# Patient Record
Sex: FEMALE | Race: WHITE | NOT HISPANIC OR LATINO | Employment: FULL TIME | URBAN - METROPOLITAN AREA
[De-identification: names, ages, dates, MRNs, and addresses within clinical notes are randomized per-mention and may not be internally consistent; named-entity substitution may affect disease eponyms.]

---

## 2017-02-17 ENCOUNTER — COMMUNICATION - RIVER FALLS (OUTPATIENT)
Dept: FAMILY MEDICINE | Facility: CLINIC | Age: 32
End: 2017-02-17

## 2017-02-23 ENCOUNTER — TRANSFERRED RECORDS (OUTPATIENT)
Dept: HEALTH INFORMATION MANAGEMENT | Facility: CLINIC | Age: 32
End: 2017-02-23

## 2017-02-23 LAB — HPV ABSTRACT: NORMAL

## 2017-03-15 ENCOUNTER — OFFICE VISIT - RIVER FALLS (OUTPATIENT)
Dept: FAMILY MEDICINE | Facility: CLINIC | Age: 32
End: 2017-03-15

## 2017-03-15 ASSESSMENT — MIFFLIN-ST. JEOR: SCORE: 1530.81

## 2017-08-22 ENCOUNTER — OFFICE VISIT - RIVER FALLS (OUTPATIENT)
Dept: FAMILY MEDICINE | Facility: CLINIC | Age: 32
End: 2017-08-22

## 2017-08-22 ASSESSMENT — MIFFLIN-ST. JEOR: SCORE: 1534.43

## 2017-08-25 ENCOUNTER — OFFICE VISIT - RIVER FALLS (OUTPATIENT)
Dept: FAMILY MEDICINE | Facility: CLINIC | Age: 32
End: 2017-08-25

## 2017-08-25 ASSESSMENT — MIFFLIN-ST. JEOR: SCORE: 1533.53

## 2017-09-08 ENCOUNTER — OFFICE VISIT - RIVER FALLS (OUTPATIENT)
Dept: FAMILY MEDICINE | Facility: CLINIC | Age: 32
End: 2017-09-08

## 2017-09-08 ASSESSMENT — MIFFLIN-ST. JEOR: SCORE: 1541.69

## 2017-09-19 ENCOUNTER — OFFICE VISIT - RIVER FALLS (OUTPATIENT)
Dept: FAMILY MEDICINE | Facility: CLINIC | Age: 32
End: 2017-09-19

## 2017-09-19 ASSESSMENT — MIFFLIN-ST. JEOR: SCORE: 1547.14

## 2017-09-29 ENCOUNTER — AMBULATORY - RIVER FALLS (OUTPATIENT)
Dept: FAMILY MEDICINE | Facility: CLINIC | Age: 32
End: 2017-09-29

## 2017-11-20 ENCOUNTER — OFFICE VISIT - RIVER FALLS (OUTPATIENT)
Dept: FAMILY MEDICINE | Facility: CLINIC | Age: 32
End: 2017-11-20

## 2019-09-16 ENCOUNTER — OFFICE VISIT - RIVER FALLS (OUTPATIENT)
Dept: FAMILY MEDICINE | Facility: CLINIC | Age: 34
End: 2019-09-16

## 2019-09-16 ASSESSMENT — MIFFLIN-ST. JEOR: SCORE: 1584.11

## 2019-09-19 ENCOUNTER — COMMUNICATION - RIVER FALLS (OUTPATIENT)
Dept: FAMILY MEDICINE | Facility: CLINIC | Age: 34
End: 2019-09-19

## 2019-09-19 ENCOUNTER — TRANSFERRED RECORDS (OUTPATIENT)
Dept: HEALTH INFORMATION MANAGEMENT | Facility: CLINIC | Age: 34
End: 2019-09-19

## 2019-09-19 LAB — HPV ABSTRACT: NORMAL

## 2019-12-24 ENCOUNTER — OFFICE VISIT - RIVER FALLS (OUTPATIENT)
Dept: FAMILY MEDICINE | Facility: CLINIC | Age: 34
End: 2019-12-24

## 2019-12-24 ASSESSMENT — MIFFLIN-ST. JEOR: SCORE: 1585.01

## 2020-03-24 ENCOUNTER — OFFICE VISIT - RIVER FALLS (OUTPATIENT)
Dept: FAMILY MEDICINE | Facility: CLINIC | Age: 35
End: 2020-03-24

## 2020-11-20 ENCOUNTER — OFFICE VISIT - RIVER FALLS (OUTPATIENT)
Dept: FAMILY MEDICINE | Facility: CLINIC | Age: 35
End: 2020-11-20

## 2020-11-20 ASSESSMENT — MIFFLIN-ST. JEOR: SCORE: 1552.92

## 2020-12-18 ENCOUNTER — COMMUNICATION - RIVER FALLS (OUTPATIENT)
Dept: FAMILY MEDICINE | Facility: CLINIC | Age: 35
End: 2020-12-18

## 2022-02-12 VITALS
BODY MASS INDEX: 36.91 KG/M2 | TEMPERATURE: 98.1 F | BODY MASS INDEX: 36.7 KG/M2 | HEIGHT: 62 IN | DIASTOLIC BLOOD PRESSURE: 84 MMHG | HEART RATE: 72 BPM | HEIGHT: 62 IN | OXYGEN SATURATION: 94 % | WEIGHT: 199.4 LBS | DIASTOLIC BLOOD PRESSURE: 78 MMHG | DIASTOLIC BLOOD PRESSURE: 72 MMHG | SYSTOLIC BLOOD PRESSURE: 118 MMHG | BODY MASS INDEX: 34.98 KG/M2 | SYSTOLIC BLOOD PRESSURE: 126 MMHG | WEIGHT: 200.6 LBS | HEART RATE: 90 BPM | SYSTOLIC BLOOD PRESSURE: 124 MMHG | WEIGHT: 192.8 LBS | TEMPERATURE: 99.3 F | HEART RATE: 80 BPM

## 2022-02-12 VITALS
DIASTOLIC BLOOD PRESSURE: 78 MMHG | WEIGHT: 201 LBS | HEIGHT: 63 IN | TEMPERATURE: 98.4 F | BODY MASS INDEX: 35.61 KG/M2 | OXYGEN SATURATION: 99 % | SYSTOLIC BLOOD PRESSURE: 118 MMHG | HEART RATE: 90 BPM

## 2022-02-12 VITALS
BODY MASS INDEX: 36.36 KG/M2 | BODY MASS INDEX: 36.4 KG/M2 | HEIGHT: 62 IN | HEIGHT: 62 IN | WEIGHT: 197.6 LBS | SYSTOLIC BLOOD PRESSURE: 116 MMHG | DIASTOLIC BLOOD PRESSURE: 86 MMHG | HEART RATE: 72 BPM | SYSTOLIC BLOOD PRESSURE: 132 MMHG | HEART RATE: 72 BPM | TEMPERATURE: 97.2 F | WEIGHT: 197.8 LBS | DIASTOLIC BLOOD PRESSURE: 70 MMHG | TEMPERATURE: 97.1 F

## 2022-02-12 VITALS
WEIGHT: 207.2 LBS | HEIGHT: 63 IN | OXYGEN SATURATION: 97 % | DIASTOLIC BLOOD PRESSURE: 60 MMHG | HEART RATE: 88 BPM | SYSTOLIC BLOOD PRESSURE: 102 MMHG | BODY MASS INDEX: 36.71 KG/M2

## 2022-02-12 VITALS
WEIGHT: 197 LBS | SYSTOLIC BLOOD PRESSURE: 122 MMHG | HEART RATE: 68 BPM | DIASTOLIC BLOOD PRESSURE: 80 MMHG | TEMPERATURE: 97.8 F | BODY MASS INDEX: 36.25 KG/M2 | HEIGHT: 62 IN

## 2022-02-12 VITALS
HEIGHT: 63 IN | BODY MASS INDEX: 36.68 KG/M2 | OXYGEN SATURATION: 97 % | SYSTOLIC BLOOD PRESSURE: 104 MMHG | DIASTOLIC BLOOD PRESSURE: 64 MMHG | HEART RATE: 78 BPM | WEIGHT: 207 LBS

## 2022-02-16 NOTE — PROGRESS NOTES
Patient:   KM FRIEDMAN            MRN: [id number]            FIN: [id number]               Age:   32 years     Sex:  Female     :  1985   Associated Diagnoses:   Pneumonia   Author:   Bhavik Epps PA-C      Report Summary   Diagnosis  Pneumonia (XYN16-BM J18.9).  Patient InstructionsOrders   Visit Information      Date of Service: 2017 09:34 am  Performing Location: [facility]  Encounter#: [id number]      Primary Care Provider (PCP):  Bhavik Epps PA-C    NPI# [id number]   Visit type:  New symptom.    Source of history:  Self, Medical record.    History limitation:  None.       Chief Complaint   2017 9:40 AM CST   Fever since last wednesday; using tylenol and IBU; barky non productive cough, HA, bodu aches, muscle stiffness, chills and fatigue        History of Present Illness             The patient presents with cough.  The cough is described as hacking.  The severity of the cough is moderate.  The cough is episodic, fluctuates in intensity and is worsening.  The cough has lasted for 5 day(s).  Associated symptoms consist of fever, Myalgias. Had influenza vaccine 4 weeks ago. , denies nasal congestion, denies rhinorrhea and denies sore throat.  CC above noted and confirmed with the patient..        Review of Systems   Eye:  Negative.    Ear/Nose/Mouth/Throat:  Negative except as documented in history of present illness.    Respiratory:  Negative except as documented in history of present illness.             Health Status   Allergies:    Allergic Reactions (All)  No Known Medication Allergies  Canceled/Inactive Reactions (All)  No known allergies   Medications:  (Selected)   Prescriptions  Prescribed  Zithromax 250 mg oral tablet: 1 packet(s), PO, Once, Instructions: as directed on package labeling. Two tablets po on day one, then one tablet daily x four days, # 6 tab(s), 0 Refill(s), Type: Soft Stop, Pharmacy: [id number], 1 packet(s) po once,Instr:as directed on  pac...   Problem list:    All Problems  Moderate Major Depression / ICD-9-.22 / Confirmed  Obese / ICD-9-.00 / Probable  Resolved: Pregnancy / SNOMED CT 612388807  Resolved: Tobacco user / ICD-9-.1      Histories   Past Medical History:    Active  Obese (278.00)  Resolved  Pregnancy (509324956):  Resolved on 5/11/2005 at 20 years.  Tobacco user (305.1):  Resolved.  Comments:  11/29/2012 CST 11:28 AM CST - Thu Todd LPN  Quit 10/12   Family History:    Hypertension  Grandmother (M) (Denise Castañeda))  Hypercholesterolemia  Father     Procedure history:    Childbirth (5784052847) in 2005 at 20 Years.  Ankle fracture - Right (155232721) in the month of 6/1997 at 12 Years.   Social History:        Alcohol Assessment: Current            Current                     Comments:                      10/11/2012 - Thu Todd LPN                     Occasional-social.4-5 drinks.      Tobacco Assessment: Past            Cigarettes, 3 per day.            Past                     Comments:                      11/29/2012 - Thu Todd LPN                     Quit 10/12.      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      Home and Environment Assessment            Marital status: Single.      Nutrition and Health Assessment            Type of diet: Regular.      Exercise and Physical Activity Assessment            Exercise type: Walking.                     Comments:                      10/11/2012 Thu Brunner LPN                     Occasional      Sexual Assessment            Sexually active: Yes.  Sexual orientation: Heterosexual.      Other Assessment            First menses age 12.  Regular menses.  Menstrual duration 10-14 days.  Cycle interval 28 days.  No history of                abnormal Pap smear.        Physical Examination   Vital Signs   11/20/2017 9:40 AM CST Temperature Tympanic 99.3 DegF    Peripheral Pulse Rate 90 bpm    HR Method Electronic    Systolic Blood Pressure 126 mmHg    Diastolic Blood Pressure 84 mmHg    Mean Arterial Pressure 98 mmHg    BP Site Right arm    BP Method Manual    Oxygen Saturation 94 %      Measurements from flowsheet : Measurements   11/20/2017 9:40 AM CST   Weight Measured - Standard                192.8 lb     General:  Alert and oriented, No acute distress.    Eye:  Pupils are equal, round and reactive to light, Extraocular movements are intact, Normal conjunctiva.    HENT:  Normocephalic, Oral mucosa is moist, No pharyngeal erythema.    Neck:  Supple, Non-tender, No lymphadenopathy.    Respiratory:  Breath sounds are equal.         Respirations: crackles at right base.    Cardiovascular:  Normal rate, Regular rhythm, No murmur.    Psychiatric:  Cooperative, Appropriate mood & affect.       Impression and Plan   Diagnosis     Pneumonia (PIA94-TQ J18.9).     Patient Instructions:       Counseled: Patient, Regarding diagnosis, Regarding treatment, Regarding medications, Regarding activity, Verbalized understanding.    Orders     Orders (Selected)   Prescriptions  Prescribed  Zithromax 250 mg oral tablet: 1 packet(s), PO, Once, Instructions: as directed on package labeling. Two tablets po on day one, then one tablet daily x four days, # 6 tab(s), 0 Refill(s), Type: Soft Stop, Pharmacy: [id number], 1 packet(s) po once,Instr:as directed on pac....     Take medicine as prescribed, side effects discussed.  Tylenol/ibuprofen for fever and discomfort.  Push fluids.  RTC if not improving in 36-48 hours, prior if concerns as we have discussed.

## 2022-02-16 NOTE — PROGRESS NOTES
Patient:   KM FRIEDMAN            MRN: [id number]            FIN: [id number]               Age:   32 years     Sex:  Female     :  1985   Associated Diagnoses:   Neck strain   Author:   Rajinder Tee MD      Chief Complaint   2017 8:15 AM CDT    Work comp f/u neck pain   Chief complaint and symptoms noted above confirmed with patient.      History of Present Illness             The patient presents with neck pain.  The location of the neck pain is the right.  The neck pain is described as aching and burning.  The severity of the neck pain is moderate.  The neck pain is constant.  The neck pain has lasted for 3 week(s).  Radiation of pain: to the right upper extremity.  IBU not helping.  not tolerating prednisone..  PT helping significantly.    Flexion 37  to 45  Extension 25 to 42  Left Rotation 61 to 70  Right Rotation  35 to 62  Left Sidebending   30  to 35  Right Sidebending  11-32.  Exacerbating factors consist of changing position, turning head and driving.  Relieving factors consist of analgesics.        Review of Systems   Constitutional:  Negative.    Musculoskeletal:  Negative except as documented in history of present illness.       Health Status   Allergies:    Allergies reviewed.     Medications:    Medications reviewed.     Problem list:    Problem list reviewed.        Histories   Past Medical History:    Past medical history reviewed.     Family History:    Family history reviewed.     Procedure history:    Procedure history reviewed.     Social History: Reviewed         Physical Examination   Vital Signs   2017 8:15 AM CDT Temperature Tympanic 98.1 DegF    Peripheral Pulse Rate 72 bpm    Pulse Site Radial artery    HR Method Manual    Systolic Blood Pressure 118 mmHg    Diastolic Blood Pressure 72 mmHg    Mean Arterial Pressure 87 mmHg    BP Site Left arm    BP Method Manual      General:  Alert and oriented, No acute distress.    Musculoskeletal:       Spine/torso exam:  Cervical ( Right, Moderate, Tenderness, Pain, No numbness, No tingling, Strength  5  out of 5, Range of motion ( Restricted by pain ) ).    Neurologic:  Alert, Oriented, Normal sensory, Normal motor function.    Psychiatric:  Cooperative, Appropriate mood & affect.       Review / Management   Results review:  No X-ray at this time, no history of trauma.       Impression and Plan   Diagnosis     Neck strain (CAH75-KC S16.1XXA).     Orders     Will go back to work no restrictions, finish with PT..        Professional Services   Counseling Summary:  This was a 15 minute visit with greater than 50% of that time spent counseling the patient.

## 2022-02-16 NOTE — PROGRESS NOTES
Patient:   KM BRUCE            MRN: [id number]            FIN: [id number]               Age:   35 years     Sex:  Female     :  1985   Associated Diagnoses:   Well adult exam; Depression   Author:   Rajinder Tee MD      Report Summary   DiagnosisCourse:  Well controlled. Counseled:  Patient.    Visit Information      Date of Service: 2020 02:02 pm  Performing Location: [facility]  Encounter#: [id number]      Primary Care Provider (PCP):  Bhavik Epps PA-C    NPI# [id number]      Referring Provider:  Rajinder Tee MD    NPI# [id number]   Visit type:  Annual exam.    Source of history:  Self.    History limitation:  None.       Chief Complaint   2020 2:21 PM [facility]   Annual physical exam. Discuss depression.     Patient presents today for a general physical.      Well Adult History   Well Adult History   The general health status is good.  The patient's diet is described as balanced.  The effect on daily activities is no change in activity level, no change in eating habits and no change in sexual activity.        Review of Systems   Constitutional:  No fever, No chills, No sweats.    Eye:  No blurring, No double vision.    Respiratory:  No shortness of breath, No cough, No wheezing.    Cardiovascular:  No chest pain, No palpitations, No peripheral edema.    Breast:  Negative.    Gastrointestinal:  No nausea, No vomiting, No diarrhea, No constipation.    Genitourinary:  No dysuria.    Gynecologic:  Negative.    Hematology/Lymphatics:  No bruising tendency.    Endocrine:  No excessive thirst, No polyuria, No cold intolerance, No heat intolerance.    Musculoskeletal:  No joint pain.    Integumentary:  No rash.    Neurologic:  Not alert and oriented X4, No confusion, No numbness, No tingling, No headache.    Psychiatric:  Depression, PHQ-9 22, Lost Father and SO.  Grief reaction..    All other systems reviewed and negative      Health Status   Allergies:    Allergic  Reactions (Selected)  No Known Medication Allergies   Medications:  (Selected)   Prescriptions  Prescribed  FLUoxetine 40 mg oral capsule: = 1 cap(s) ( 40 mg ), Oral, daily, # 90 cap(s), 3 Refill(s), Type: Maintenance, Pharmacy: [id number], 1 cap(s) Oral daily   Problem list:    All Problems  Macromastia / SNOMED CT 3655899752 / Confirmed  Obese / ICD-9-.00 / Probable  Moderate Major Depression / ICD-9-.22 / Confirmed  Resolved: Tobacco user / ICD-9-.1  Resolved: Pregnancy / SNOMED CT 475946659      Histories   Past Medical History:    Active  Obese (ICD-9-.00)  Moderate Major Depression (ICD-9-.22)  Macromastia (SNOMED CT 5907220546)  Resolved  Pregnancy (SNOMED CT 729034710): Onset on 8/4/2004 at 19 years.  Resolved on 5/11/2005 at 20 years.  Tobacco user (ICD-9-.1):  Resolved.  Comments:  11/29/2012 CST 11:28 AM CST - Thu Todd LPN  Quit 10/12   Family History:    Hypertension  Grandmother (M) (Denise Castañeda))  Hypercholesterolemia  Father     Procedure history:    Ankle fracture - Right (SNOMED CT 796156866) in the month of 6/1997 at 12 Years.   Social History:        Electronic Cigarette/Vaping Assessment            Electronic Cigarette Use: Never.      Alcohol Assessment: Current            Current                     Comments:                      10/11/2012 - Thu Todd LPN                     Occasional-social.4-5 drinks.      Tobacco Assessment: Current            5-9 cigarettes (between 1/4 to 1/2 pack)/day in last 30 days, Cigarettes, 5 per day.  Total pack years: 18.               Ready to change: Yes.            Never (less than 100 in lifetime)            Past                     Comments:                      11/29/2012 - Thu Todd LPN                     Quit 10/12.      Substance Abuse Assessment: Denies Substance Abuse            Never      Employment and Education Assessment            Work/School description: LPN.  Highest  education level: Associate degree.                     Comments:                      10/11/2012 Thu Brunner LPN                     Works part time.      Home and Environment Assessment            Marital status: .  Spouse/Partner name: Topher.  Living situation: Home/Independent.               Injuries/Abuse/Neglect in household: No.  Agency(s)/Others notified: No.  Family/Friends available for               support: Yes.  Risks in environment: Pool/Gaming, Stairs.      Nutrition and Health Assessment            Type of diet: Regular.      Exercise and Physical Activity Assessment            Exercise type: Walking.                     Comments:                      10/11/2012 Thu Brunner LPN                     Occasional      Sexual Assessment            Identifies as female, History of STD: No.  Contraceptive Use Details: None.  History of sexual abuse: No.            Sexually active: Yes.  Sexual orientation: Heterosexual.      Other Assessment            First menses age 12.  Regular menses.  Menstrual duration 10-14 days.  Cycle interval 28 days.  No history of               abnormal Pap smear.  ,        Electronic Cigarette/Vaping Assessment            Electronic Cigarette Use: Never.      Alcohol Assessment: Current            Current                     Comments:                      10/11/2012 Thu Brunner LPN                     Occasional-social.4-5 drinks.      Tobacco Assessment: Current            5-9 cigarettes (between 1/4 to 1/2 pack)/day in last 30 days, Cigarettes, 5 per day.  Total pack years: 18.               Ready to change: Yes.            Never (less than 100 in lifetime)            Past                     Comments:                      11/29/2012 Thu Brunner LPN                     Quit 10/12.      Substance Abuse Assessment: Denies Substance Abuse            Never      Employment and Education Assessment            Work/School description: LPN.  [employer]  education level: Associate degree.                     Comments:                      10/11/2012 - [employer]                     Works part time.      Home and Environment Assessment            Marital status: .  Spouse/Partner name: Topher.  Living situation: Home/Independent.               Injuries/Abuse/Neglect in household: No.  Agency(s)/Others notified: No.  Family/Friends available for               support: Yes.  Risks in environment: Pool/Gaming, Stairs.      Nutrition and Health Assessment            Type of diet: Regular.      Exercise and Physical Activity Assessment            Exercise type: Walking.                     Comments:                      10/11/2012 - Thu Todd LPN                     Occasional      Sexual Assessment            Identifies as female, History of STD: No.  Contraceptive Use Details: None.  History of sexual abuse: No.            Sexually active: Yes.  Sexual orientation: Heterosexual.      Other Assessment            First menses age 12.  Regular menses.  Menstrual duration 10-14 days.  Cycle interval 28 days.  No history of               abnormal Pap smear.        Physical Examination   Vital Signs   11/20/2020 2:21 PM CST Temperature Tympanic 98.4 DegF    Peripheral Pulse Rate 90 bpm    Systolic Blood Pressure 118 mmHg    Diastolic Blood Pressure 78 mmHg    Mean Arterial Pressure 91 mmHg    BP Site Right arm    BP Method Manual    Oxygen Saturation 99 %      Measurements from flowsheet : Measurements   11/20/2020 2:21 PM CST Height Measured - Standard 62.5 in    Weight Measured - Standard 201 lb    BSA 2 m2    Body Mass Index 36.17 kg/m2  HI      General:  Alert and oriented, No acute distress.    Eye:  Pupils are equal, round and reactive to light, Extraocular movements are intact, Normal conjunctiva.    HENT:  Normocephalic, Tympanic membranes are clear, Normal hearing, Oral mucosa is moist, No pharyngeal erythema.    Neck:  Supple, No carotid bruit, No  lymphadenopathy, No thyromegaly.    Respiratory:  Lungs are clear to auscultation, Breath sounds are equal.    Cardiovascular:  Regular rhythm, No murmur, Good pulses equal in all extremities, No edema.    Breast:  Declined Exam.    Gastrointestinal:  Soft, Non-tender, Normal bowel sounds, No organomegaly.    Genitourinary:  Declined Exam.    Musculoskeletal:  Normal range of motion.    Integumentary:  Warm, Dry, Pink.    Neurologic:  Alert, Oriented, Normal sensory, Normal motor function, No focal deficits.    Psychiatric:  Cooperative.         Mood and affect: Depressed, Labile.         Behavior: Relaxed.         Judgment: Able to make sensible decisions.         Thought process: Appropriate.       Impression and Plan   Diagnosis     Well adult exam (AOF30-BO Z00.00).     Course:  Well controlled.    Diagnosis     Depression (UKY28-SN F32.9).     Course:  Worsening.    Orders     Orders   Pharmacy:  FLUoxetine 20 mg oral capsule (Prescribe): = 1 cap(s) ( 20 mg ), Oral, daily, # 30 cap(s), 0 Refill(s), Type: Maintenance, Pharmacy: [facility], 1 cap(s) Oral daily, 62.5, in, 11/20/2020 2:21 PM CST, Height Measured, 201, lb, 11/20/2020 2:21 PM CST, Weight Measured.     Orders   Requests (Consults / Referrals):  Referral (Request) (Order): 11/20/2020 2:37 PM CST, Referred to: Psychology, Depression.     Orders   Requests (Return to Office):  Return to Clinic (Request) (Order): RFV: Video visit for recheck on depression, Return in 1 month.     Counseled:  Patient.

## 2022-02-16 NOTE — PROGRESS NOTES
Patient:   KM FRIEDMAN            MRN: [id number]            FIN: [id number]               Age:   32 years     Sex:  Female     :  1985   Associated Diagnoses:   Neck strain   Author:   Rajinder Tee MD      Chief Complaint   2017 11:19 AM CDT    Work comp neck pain - improved     Chief complaint and symptoms noted above confirmed with patient.      History of Present Illness             The patient presents with neck pain.  The location of the neck pain is the right.  The neck pain is described as aching and burning.  The severity of the neck pain is moderate.  The neck pain is constant.  The neck pain has lasted for 3 week(s).  Radiation of pain: to the right upper extremity.  IBU not helping.  not tolerating prednisone..  PT helping significantly.    Flexion 37  to 47  Extension 25 to 42  Left Rotation 61 to 66  Right Rotation  35 to 62  Left Sidebending   30  to 35  Right Sidebending  11-30.  Exacerbating factors consist of changing position, turning head and driving.  Relieving factors consist of analgesics.        Review of Systems   Constitutional:  Negative.    Musculoskeletal:  Negative except as documented in history of present illness.       Health Status   Allergies:    Allergic Reactions (Selected)  No Known Medication Allergies   Medications:  (Selected)   Prescriptions  Prescribed  tiZANidine 2 mg oral capsule: 1 cap(s) ( 2 mg ), po, q8 hrs, PRN: as needed for muscle spasm, # 30 cap(s), 0 Refill(s), Type: Maintenance, Pharmacy: [facility] #[id number], 1 cap(s) po q8 hrs,PRN:as needed for muscle spasm   Problem list: Reviewed      Histories   Past Medical History:    Past medical history reviewed.   Family History:    Family history reviewed.   Procedure history:    Procedure history reviewed.   Social History: Reviewed      Physical Examination   Vital Signs   2017 11:19 AM CDT Peripheral Pulse Rate 80 bpm    Pulse Site Radial artery    HR Method Manual    Systolic Blood  Pressure 124 mmHg    Diastolic Blood Pressure 78 mmHg    Mean Arterial Pressure 93 mmHg    BP Site Right arm    BP Method Manual      General:  Alert and oriented, No acute distress.    Musculoskeletal:       Spine/torso exam: Cervical ( Right, Moderate, Tenderness, Pain, No numbness, No tingling, Strength  5  out of 5, Range of motion ( Restricted by pain ) ).    Neurologic:  Alert, Oriented, Normal sensory, Normal motor function.    Psychiatric:  Cooperative, Appropriate mood & affect.       Review / Management   Results review:  No X-ray at this time, no history of trauma.       Impression and Plan   Diagnosis     Neck strain (ETF90-VU S16.1XXA).     Orders     Will try going back to work no lifting more than 10 pounds.  Continue with PT..        Professional Services   Counseling Summary:  This was a 15 minute visit with greater than 50% of that time spent counseling the patient.

## 2022-02-16 NOTE — LETTER
(Inserted Image. Unable to display)   [address] [place] [address]  September 19, 2019      KM BRUCE       [address], [place] [id number]      Dear KM,    Thank you for selecting [facility] for your healthcare needs. Below you will find the results of the recent tests done at our clinic.    Normal pap smear with negative HPV.  The pap can be repeated every five years unless there is concern about increased risk. We still will want to see you once a year for an annual exam.    Result Name Current Result   ThinPrep PAP with HPV   9/16/2019       Please contact me or my assistant at [phone] if you have any questions or concerns.     Sincerely,        Landon Mcdonald M.D.

## 2022-02-16 NOTE — NURSING NOTE
Depression Screening Entered On:  9/16/2019 1:58 PM CDT    Performed On:  9/16/2019 1:57 PM CDT by Rhiannon Mccall CMA               Depression Screening   Little Interest - Pleasure in Activities :   Several days   Feeling Down, Depressed, Hopeless :   Nearly every day   Initial Depression Screen Score :   4    Trouble Falling or Staying Asleep :   Several days   Feeling Tired or Little Energy :   Nearly every day   Poor Appetite or Overeating :   Nearly every day   Feeling Bad About Yourself :   Several days   Trouble Concentrating :   Several days   Moving or Speaking Slowly :   Not at all   Thoughts Better Off Dead or Hurting Self :   Not at all   Detailed Depression Screen Score :   9    Total Depression Screen Score :   13    EITAN Difficulty with Work, Home, Others :   Very difficult   Rhiannon Mccall CMA - 9/16/2019 1:57 PM CDT

## 2022-02-16 NOTE — NURSING NOTE
Comprehensive Intake Entered On:  12/24/2019 10:27 AM CST    Performed On:  12/24/2019 10:24 AM CST by Rhiannon Mccall CMA               Summary   Chief Complaint :   follow-up Depression and smoking cessiation; smoke free for 2 months    Last Menstrual Period :   12/3/2019 CST   Menstrual Status :   Premenarcheal   Weight Measured :   207.2 lb(Converted to: 207 lb 3 oz, 93.98 kg)    Height Measured :   62.75 in(Converted to: 5 ft 3 in, 159.38 cm)    Body Mass Index :   36.99 kg/m2 (HI)    Body Surface Area :   2.04 m2   Systolic Blood Pressure :   102 mmHg   Diastolic Blood Pressure :   60 mmHg   Mean Arterial Pressure :   74 mmHg   Peripheral Pulse Rate :   88 bpm   Oxygen Saturation :   97 %   Rhiannon Mccall CMA - 12/24/2019 10:24 AM CST   Health Status   Allergies Verified? :   Yes   Medication History Verified? :   Yes   Immunizations Current :   Yes   Medical History Verified? :   Yes   Pre-Visit Planning Status :   Completed   Tobacco Use? :   Former smoker   Rhiannon Mccall CMA - 12/24/2019 10:24 AM CST   Consents   Consent for Immunization Exchange :   Consent Granted   Consent for Immunizations to Providers :   Consent Granted   Rhiannon Mccall CMA - 12/24/2019 10:24 AM CST   Meds / Allergies   (As Of: 12/24/2019 10:27:38 AM [facility])   Allergies (Active)   No Known Medication Allergies  Estimated Onset Date:   Unspecified ; Created By:   Carolann Sidhu CMA; Reaction Status:   Active ; Category:   Drug ; Substance:   No Known Medication Allergies ; Type:   Allergy ; Updated By:   Carolann Sidhu CMA; Reviewed Date:   12/24/2019 10:26 AM [facility]        Medication List   (As Of: 12/24/2019 10:27:38 AM [facility])   Prescription/Discharge Order    buPROPion  :   buPROPion ; Status:   Prescribed ; Ordered As Mnemonic:   Wellbutrin  mg/24 hours oral tablet, extended release ; Simple Display Line:   150 mg, 1 tab(s), Oral, q 24 hrs, decreased dose, 30 tab(s), 0 Refill(s) ; Ordering Provider:   Landon Mcdonald MD; Catalog Code:    buPROPion ; Order Dt/Tm:   12/13/2019 10:08:16 AM CST          FLUoxetine  :   FLUoxetine ; Status:   Prescribed ; Ordered As Mnemonic:   FLUoxetine 20 mg oral capsule ; Simple Display Line:   20 mg, 1 cap(s), Oral, daily, 30 cap(s), 0 Refill(s) ; Ordering Provider:   Landon Mcdonald MD; Catalog Code:   FLUoxetine ; Order Dt/Tm:   12/13/2019 10:08:15 AM CST

## 2022-02-16 NOTE — PROGRESS NOTES
Patient:   KM FRIEDMAN            MRN: [id number]            FIN: [id number]               Age:   32 years     Sex:  Female     :  1985   Associated Diagnoses:   Pap smear of cervix shows high risk HPV present; Generalized anxiety disorder; Dysthymia   Author:   Landon Mcdonald MD      CC: abnormal pap follow up    HPI: Patient with normal pap with + high risk HPV. Here for colposcopy. No prior abnormal pap smears.  Also notes improvement in anxiety and mood but not 100% effective. would like to try increasing dose of wellbutrin.      Physical Examination   Genitourinary:  No costovertebral angle tenderness, No inguinal tenderness, No urethral discharge, No lesions.    Patient is alert and cooperative. Normal affect      Procedure   Colposcopy procedure   Confirmed: patient.     Informed consent: signed by patient.     Preparation and technique: placed in lithotomy position, vaginal speculum inserted, colposcope placed, cervix inspected, cervix swabbed (with saline solution, with acetic acid solution), cervix re-inspected.     Findings: original squamous epithelium (smooth and pink, no change after acetic acid applied), no abnormal colposcopic lesion noted, no cervical inflammation noted, no cervical lesion noted.     Procedure tolerated: well.     Complications: none.        Impression and Plan   Diagnosis     Pap smear of cervix shows high risk HPV present (PBV66-SR R87.810).     Orders     Orders (Selected)   Outpatient Orders  Order  Return to Clinic (Request): RFV: repeat pap smear due in one year, Return in 1 year.     Diagnosis     Generalized anxiety disorder (HGN54-QQ F41.1).     Dysthymia (MPH44-MW F34.1).     Course:  Improving.    Plan:  Will increase wellbutrin. Follow up as needed..    Orders     Orders (Selected)   Prescriptions  Prescribed  Wellbutrin  mg/24 hours oral tablet, extended release: 1 tab(s) ( 300 mg ), po, q 24 hrs, # 30 tab(s), 2 Refill(s), Type: Maintenance, Pharmacy:  [id number], 1 tab(s) po q 24 hrs.

## 2022-02-16 NOTE — PROGRESS NOTES
Patient:   KM BRUCE            MRN: [id number]            FIN: [id number]               Age:   34 years     Sex:  Female     :  1985   Associated Diagnoses:   Well adult exam; Macromastia; Moderate Major Depression; Tobacco use   Author:   Landon Mcdonald MD      Visit Information   Visit type:  Annual exam.    Source of history:  Self.    History limitation:  None.       Chief Complaint   2019 1:04 PM CDT    Phyiscal        Well Adult History   Well Adult History             The patient presents for well adult exam.  The patient's general health status is described as patient has noticed increased weight gain, fatigue, and depression symptoms. Otherwise, has felt healthy.  The patient's diet is described as reviewed with patient, not following any specific diet currently although tries to eat healthy.  Exercise: occasional, works nights, notes increased back pain also interferes with exercise.  Last menstrual period: regular.  Medical encounters: none.  Additional pertinent history: due for pap smear today.     patient has other concerns today as well  depression has been worse recently, PHQ9 reviewed and is elevated, no suicidal thoughts, would like to go back on medication, has been smoking about 5-6 cigarettes a day so would like to try bupropion again  also discussed back pain and large breasts, ongoing upper back pain, wears 40F bra when she wears a regular bra but usually wears sports bras to try to provide more support and compression, interferes with exercise, has been thinking about consultation for reduction but not ready to schedule at this time  discussed options for managing diet, encouraged to consider tracking and being more mindful about food choices, 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   Family History:    Hypertension  Grandmother (M) (Denise (Julita))  Hypercholesterolemia  Father     Procedure history:    Childbirth (2450887873) in 2005 at 20 Years.  Ankle fracture - Right (559226000) in the month of 6/1997 at 12 Years.   Social History:        Alcohol Assessment: Current            Current                     Comments:                      10/11/2012 - Thu Todd LPN                     Occasional-social.4-5 drinks.      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      Exercise and Physical Activity Assessment            Exercise type: Walking.                     Occasional      Sexual Assessment            Sexually active: Yes.  Sexual orientation: Heterosexual.      Other Assessment            First menses age 12.  Regular menses.  Menstrual duration 10-14 days.  Cycle interval 28 days.  No history of               abnormal Pap smear.        Physical Examination   Last Menstrual Period: 9/9/2019     Vital Signs   9/16/2019 1:04 PM CDT Peripheral Pulse Rate 78 bpm    Systolic Blood Pressure 104 mmHg    Diastolic Blood Pressure 64 mmHg    Mean Arterial Pressure 77 mmHg    Oxygen Saturation 97 %      Measurements from flowsheet : Measurements   9/16/2019 1:04 PM CDT Height Measured - Standard  62.75 in    Weight Measured - Standard 207.0 lb    BSA 2.04 m2    Body Mass Index 36.96 kg/m2  HI      General:  Alert and oriented, No acute distress.    Eye:  Pupils are equal, round and reactive to light, Extraocular movements are intact, Normal conjunctiva.    HENT:  Normocephalic, Tympanic membranes are clear, Oral mucosa is moist, No pharyngeal erythema.    Neck:  Supple, Non-tender, No lymphadenopathy, No thyromegaly.    Respiratory:  Lungs are clear to auscultation.    Cardiovascular:  Normal rate, Regular rhythm.    Breast:  No mass, No tenderness, No discharge.    Gastrointestinal:  Soft, Non-tender, Non-distended, No organomegaly.    Genitourinary:  Normal genitalia for age and sex, No urethral discharge, No lesions.         Perineum: Within normal limits.         Vagina: Within normal limits.         Uterus: Within normal limits.         Ovaries: Within normal limits.         Adnexa: Within normal limits.    Musculoskeletal:  Normal range of motion, Normal strength, tender in upper back, grooves noted in shoulders from brastraps.    Integumentary:  Warm, Dry, Pink, No rash, no concerning lesions.    Neurologic:  Alert, Oriented, Normal sensory.    Psychiatric:  Cooperative, Appropriate mood & affect.       Impression and Plan   Diagnosis     Well adult exam (DSO95-WY Z00.00).     Course:  Progressing as expected.    Patient Instructions:       Counseled: Patient, BMI, diet, and exercise.    Diagnosis     Macromastia (QVB07-EO N62).     Course:  patient will let me know if she is willing to proceed with consult regarding breast reduction, which I think could help significantly with pain and activity level.    Diagnosis     Moderate Major Depression (JZG41-ZL F32.1).     Tobacco use (TMK95-FX Z72.0).     Course:  Worsening.    Plan:  call in 4 weeks with update to consider whether increased dose is needed.    Orders     Orders (Selected)   Prescriptions  Prescribed  Wellbutrin  mg/24 hours oral  tablet, extended release: = 1 tab(s) ( 150 mg ), po, q 24 hrs, # 30 tab(s), 5 Refill(s), Type: Maintenance, Pharmacy: [id number], 1 tab(s) Oral q 24 hrs.

## 2022-02-16 NOTE — PROGRESS NOTES
Patient:   KM FRIEDMAN            MRN: [id number]            FIN: [id number]               Age:   32 years     Sex:  Female     :  1985   Associated Diagnoses:   None   Author:   Rajinder Tee MD       -   Today's date:  2017 11:35:00 AM .        -   To whom it may concern:        This patient is currently under my care and Was seen in my office on  2017.  .  He/ she may return to work, with the following restrictions: no lifting greater than  10  pounds.  The patient will need follow-up care in  2  weeks.  Please contact me if you have any questions or concerns.      -   Sincerely,             Rajinder Tee MD  [address]  [address] [phone]

## 2022-02-16 NOTE — NURSING NOTE
CAGE Assessment Entered On:  12/11/2020 9:39 AM CST    Performed On:  11/20/2020 9:39 AM CST by Ana Maria Padilla               Assessment   Have you ever felt you should cut down on your drinking :   No   Have people annoyed you by criticizing your drinking :   No   Have you ever felt bad or guilty about your drinking :   No   Have you ever taken a drink first thing in the morning to steady your nerves or get rid of a hangover (Eye-opener) :   No   CAGE Score :   0    Ana Maria Padilla - 12/11/2020 9:39 AM CST

## 2022-02-16 NOTE — TELEPHONE ENCOUNTER
---------------------  From: Landon Mcdonald MD   Sent: 3/25/2020 3:15:08 PM CDT  Subject: lab results     COVID-19 testing negative for disease. Patient called and is reassured. No fevers, noting fatigue and achiness. No shortness of breath. Discussed that testing isn't perfect but this is good news. Should still wait to return to work until symptoms have resolved.

## 2022-02-16 NOTE — PROGRESS NOTES
Patient:   KM FRIEDMAN            MRN: [id number]            FIN: [id number]               Age:   32 years     Sex:  Female     :  1985   Associated Diagnoses:   Neck strain   Author:   Rajinder Tee MD      Chief Complaint   2017 8:47 AM CDT    Work comp neck pain - some improvement   Chief complaint and symptoms noted above confirmed with patient.      History of Present Illness             The patient presents with neck pain.  The location of the neck pain is the right.  The neck pain is described as aching and burning.  The severity of the neck pain is moderate.  The neck pain is constant.  The neck pain has lasted for 9 day(s).  Radiation of pain: to the right upper extremity.  IBU not helping.  not tolerating prednisone..  Exacerbating factors consist of changing position, turning head and driving.  Relieving factors consist of analgesics.        Review of Systems   Constitutional:  Negative.    Musculoskeletal:  Negative except as documented in history of present illness.       Health Status   Allergies:    Allergies reviewed.        Medications: reviewed      Problem list: Reviewed         Histories   Past Medical History:    Past medical history reviewed.        Family History: Noncontributory      Procedure history:    Procedure history reviewed.        Social History: Reviewed         Physical Examination   Vital Signs   2017 8:47 AM CDT Temperature Tympanic 97.2 DegF  LOW    Peripheral Pulse Rate 72 bpm    Pulse Site Radial artery    HR Method Manual    Systolic Blood Pressure 132 mmHg    Diastolic Blood Pressure 86 mmHg    Mean Arterial Pressure 101 mmHg    BP Site Left arm    BP Method Manual      General:  Alert and oriented, No acute distress.    Musculoskeletal:       Spine/torso exam: Cervical ( Right, Moderate, Tenderness, Pain, No numbness, No tingling, Strength  5  out of 5, Range of motion ( Restricted by pain ) ).    Neurologic:  Alert, Oriented, Normal sensory,  Normal motor function.    Psychiatric:  Cooperative, Appropriate mood & affect.       Review / Management   Results review:  No X-ray at this time, no history of trauma.       Impression and Plan   Diagnosis     Neck strain (LZV55-FQ S16.1XXA).     Orders     Orders   Pharmacy:  predniSONE 10 mg oral tablet (Complete).     Orders   Requests (Consults / Referrals):  Referral (Request) (Order): 8/25/2017 9:01 AM CDT, Referred to: Physical Therapy, Neck strain.     Will keep off work for another week..        Professional Services   Counseling Summary:  This was a 15 minute visit with greater than 50% of that time spent counseling the patient.

## 2022-02-16 NOTE — TELEPHONE ENCOUNTER
---------------------  From: Kary Coleman LPN (Phone Messages Pool ([id number]_[facility]))   To: [facility] Message Pool ([id number]_[facility]);     Sent: 2/3/2021 12:20:26 PM CST  Subject: medication follow up     Phone Message    PCP:   UZAIR asked for T      Time of Call:  11:02am       Person Calling:  pt  Phone number:  [phone]    Returned call at: 12:15pm    Note:   Pt LM stating she is following up on medications. Pt says she feels like the Prozac is working well. She says she is having a hard time getting in a good grove with using the Zyprexa. Pt says when she gets home from work in the morning she sometimes has to be up by 2pm and if she does not get 6-8 hours of sleep she feels groggy and out of it.    Pt says when she is home and not having to work night shift the Zyprexa works but she sometimes is not able to take it. Pt says she does not want to mix the 2 medications permanently. Pt wanting to discuss stating on Prozac and maybe discontinuing Zyprexa and using melatonin or an OTC sleeping medication for when she needs to get sleep.    LM for pt letting her know message was received and will be forwarded to [facility] when he is back in clinic tomorrow.    Last office visit and reason:  11/20/20 CHT well adult exam- female---------------------  From: Rylie Kelly CMA ([facility] Message Pool ([id number]_[facility]))   To: Rajinder Tee MD;     Sent: 2/4/2021 7:58:08 AM CST  Subject: FW: medication follow up  ** Submitted: **  Order:FLUoxetine (FLUoxetine 20 mg oral capsule)  1 cap(s)  Oral  daily  Qty:  90 cap(s)        Refills:  1          Substitutions Allowed     Route To Pharmacy - [facility]    Signed by Rajinder Tee MD  2/4/2021 4:56:00 PM [facility]    ** Submitted: **  Complete:OLANZapine (OLANZapine 2.5 mg oral tablet)   Signed by Rajinder Tee MD  2/4/2021 4:56:00 PM [facility]---------------------  From: Rajinder Tee MD   To:  T Message Pool ([id number]_[facility]);     Sent: 2/4/2021 11:00:27 AM CST  Subject: RE: medication follow up     Called and advised OK to take fluoxetine for 180 days and will eval.  PHQ 9 in chart from phone call.

## 2022-02-16 NOTE — NURSING NOTE
Depression Screening Entered On:  2/4/2021 10:58 AM CST    Performed On:  2/4/2021 10:58 AM CST by Rajinder Tee MD               Depression Screening   Little Interest - Pleasure in Activities :   Not at all   Feeling Down, Depressed, Hopeless :   Not at all   Initial Depression Screen Score :   0 Score   Poor Appetite or Overeating :   Not at all   Trouble Falling or Staying Asleep :   Not at all   Feeling Tired or Little Energy :   Several days   Feeling Bad About Yourself :   Not at all   Trouble Concentrating :   Not at all   Moving or Speaking Slowly :   Not at all   Thoughts Better Off Dead or Hurting Self :   Not at all   Difficulty at Work, Home, Getting Along :   Not difficult at all   Detailed Depression Screen Score :   1    Total Depression Screen Score :   1    Rajinder Tee MD - 2/4/2021 10:58 AM CST

## 2022-02-16 NOTE — NURSING NOTE
CAGE Assessment Entered On:  9/16/2019 1:58 PM CDT    Performed On:  9/16/2019 1:58 PM CDT by Rhiannon Mccall CMA               Assessment   Have you ever felt you should cut down on your drinking :   No   Have people annoyed you by criticizing your drinking :   No   Have you ever felt bad or guilty about your drinking :   No   Have you ever taken a drink first thing in the morning to steady your nerves or get rid of a hangover (Eye-opener) :   No   CAGE Score :   0    Rhiannon Mccall CMA - 9/16/2019 1:58 PM CDT

## 2022-02-16 NOTE — TELEPHONE ENCOUNTER
---------------------  From: Kimberli Puga CMA   Sent: 3/24/2020 12:55:01 PM CDT  Subject: [facility]     Reported PUI for COVID-19 to [facility]. Specimen sent to [facility].

## 2022-02-16 NOTE — PROGRESS NOTES
Chief Complaint    follow-up Depression and smoking cessiation; smoke free for 2 months  History of Present Illness      patient here for depression follow up      has been taking bupropion and fluoxetine      now smoke free for 3 months      would like to go off bupropion and adjust dosing of fluoxetine      overall she feels like mood has been better  Physical Exam   Vitals & Measurements    HR: 88(Peripheral)  BP: 102/60  SpO2: 97%     HT: 62.75 in  WT: 207.2 lb  BMI: 36.99       alert and cooperative      mood and affect normal      10/15 minutes in counseling  Assessment/Plan       1. Moderate Major Depression (F32.1)         patient doing well, will stop bupropion and start fluoxetine 40mg, follow up in one year, sooner as needed         Ordered:          FLUoxetine, = 1 cap(s) ( 20 mg ), Oral, daily, # 30 cap(s), 0 Refill(s), Type: Hard Stop, Pharmacy: [id number], (Completed)                Orders:         buPROPion, = 1 tab(s) ( 150 mg ), Oral, q 24 hrs, Instructions: decreased dose, # 30 tab(s), 0 Refill(s), Type: Maintenance, Pharmacy: [id number], 1 tab(s) Oral q 24 hrs,Instr:decreased dose, (Completed)         FLUoxetine, = 1 cap(s) ( 40 mg ), Oral, daily, # 90 cap(s), 3 Refill(s), Type: Maintenance, Pharmacy: [id number], 1 cap(s) Oral daily, (Ordered)  Patient Information     Name:KM BRUCE      Address:      [address] [id number]     Sex:Female     YOB: 1985     [phone]     Emergency Contact:PATTI FRIEDMAN     MRN:[id number]     FIN:[id number]     Location:[facility]     Date of Service:12/24/2019      Primary Care Physician:       Bhavik Epps PA-C, [phone]      Attending Physician:       Landon Mcdonald MD, [phone]  Problem List/Past Medical History    Ongoing     Macromastia     Moderate Major Depression     Obese    Historical     Pregnancy     Tobacco user       Comments: Quit  10/12  Procedure/Surgical History     Ankle fracture - Right (06.1997)        Medications    FLUoxetine 40 mg oral capsule, 40 mg= 1 cap(s), Oral, daily, 3 refills  Allergies    No Known Medication Allergies  Social History    Smoking Status - 12/24/2019     Former smoker     Alcohol - Current, 10/31/2012      Current, 10/11/2012     Employment/School      Work/School description: LPN. Highest education level: Associate degree., 10/03/2019     Exercise      Exercise type: Walking., 10/31/2012     Home/Environment      Marital status: . Spouse/Partner name: Topher. Living situation: Home/Independent. Injuries/Abuse/Neglect in household: No. Agency(s)/Others notified: No. Family/Friends available for support: Yes. Risks in environment: Pool/Gaming, Stairs., 10/03/2019     Nutrition/Health      Type of diet: Regular., 10/11/2012     Other      First menses age 12. Regular menses. Menstrual duration 10-14 days. Cycle interval 28 days. No history of abnormal Pap smear., 10/31/2012     Sexual      Identifies as female, History of STD: No. Contraceptive Use Details: None. History of sexual abuse: No., 10/03/2019      Sexually active: Yes. Sexual orientation: Heterosexual., 10/11/2012     Substance Abuse - Denies Substance Abuse, 10/31/2012      Never, 10/11/2012     Tobacco - Current, 10/03/2019      5-9 cigarettes (between 1/4 to 1/2 pack)/day in last 30 days, Cigarettes, 5 per day. Total pack years: 18. Ready to change: Yes., 10/03/2019      Past, 11/29/2012  Family History    Hypercholesterolemia: Father.    Hypertension: Grandmother (M).  Immunizations      Vaccine Date Status          influenza virus vaccine, inactivated 09/16/2019 Given          influenza virus vaccine, inactivated 09/29/2017 Given          influenza virus vaccine, inactivated 09/24/2013 Given          influenza virus vaccine, inactivated 09/01/2012 Recorded              Comments : [10/11/2012] Given @ work.          tetanus/diphth/pertuss (Tdap)  adult/adol 11/12/2009 Recorded          Td 11/01/2009 Recorded          influenza 11/16/2004 Recorded          Hep B 05/28/1997 Recorded          influenza 05/01/1997 Recorded          Hep B 11/27/1996 Recorded          influenza 11/01/1996 Recorded          Hep B 10/30/1996 Recorded          influenza 10/01/1996 Recorded          MMR (measles/mumps/rubella) 04/06/1995 Recorded

## 2022-02-16 NOTE — NURSING NOTE
Comprehensive Intake Entered On:  11/20/2020 2:28 PM CST    Performed On:  11/20/2020 2:21 PM CST by Rylie Kelly CMA               Summary   Chief Complaint :   Annual physical exam. Discuss depression.   Last Menstrual Period :   9/30/2020 CDT   Menstrual Status :   Menarcheal   Weight Measured :   201 lb(Converted to: 201 lb 0 oz, 91.172 kg)    Height Measured :   62.5 in(Converted to: 5 ft 2 in, 158.75 cm)    Body Mass Index :   36.17 kg/m2 (HI)    Body Surface Area :   2 m2   Systolic Blood Pressure :   118 mmHg   Diastolic Blood Pressure :   78 mmHg   Mean Arterial Pressure :   91 mmHg   Peripheral Pulse Rate :   90 bpm   BP Site :   Right arm   BP Method :   Manual   Temperature Tympanic :   98.4 DegF(Converted to: 36.9 DegC)    Oxygen Saturation :   99 %   Rylie Kelly CMA - 11/20/2020 2:21 PM CST   Health Status   Allergies Verified? :   Yes   Medication History Verified? :   Yes   Immunizations Current :   Yes   Medical History Verified? :   Yes   Pre-Visit Planning Status :   Completed   Tobacco Use? :   Former smoker   Rylie Kelly CMA - 11/20/2020 2:21 PM CST   Consents   Consent for Immunization Exchange :   Consent Granted   Consent for Immunizations to Providers :   Consent Granted   Rylie Kelly CMA - 11/20/2020 2:21 PM CST   Meds / Allergies   (As Of: 11/20/2020 2:28:52 PM CST)   Allergies (Active)   No Known Medication Allergies  Estimated Onset Date:   Unspecified ; Created By:   Carolann Sidhu CMA; Reaction Status:   Active ; Category:   Drug ; Substance:   No Known Medication Allergies ; Type:   Allergy ; Updated By:   Carolann Sidhu CMA; Reviewed Date:   11/20/2020 2:25 PM CST        Medication List   (As Of: 11/20/2020 2:28:52 PM CST)   Prescription/Discharge Order    FLUoxetine  :   FLUoxetine ; Status:   Prescribed ; Ordered As Mnemonic:   FLUoxetine 40 mg oral capsule ; Simple Display Line:   40 mg, 1 cap(s), Oral, daily, 90 cap(s), 3 Refill(s) ; Ordering Provider:   Tamara KIRAN,  Landon; Catalog Code:   FLUoxetine ; Order Dt/Tm:   12/24/2019 10:42:51 AM CST            ID Risk Screen   Recent Travel History :   No recent travel   Family Member Travel History :   No recent travel   Other Exposure to Infectious Disease :   Unknown   Rylie Kelly CMA - 11/20/2020 2:21 PM CST   Social History   Social History   (As Of: 11/20/2020 2:28:53 PM CST)   Alcohol:  Current      Current   Comments:  10/11/2012 9:59 AM - Thu Todd LPN: Occasional-social.4-5 drinks.   (Last Updated: 10/11/2012 10:00:00 AM CDT by Thu Todd LPN)          Tobacco:  Current      5-9 cigarettes (between 1/4 to 1/2 pack)/day in last 30 days, Cigarettes, 5 per day.  Total pack years: 18.  Ready to change: Yes.   (Last Updated: 10/3/2019 3:11:55 PM CDT by Jeanette Rudolph)   Past   Comments:  11/29/2012 11:28 AM - Thu Todd LPN: Quit 10/12.   (Last Updated: 11/29/2012 11:28:47 AM CST by Thu Todd LPN)   Never (less than 100 in lifetime)   (Last Updated: 11/20/2020 2:25:08 PM CST by Rylie Kelly CMA)          Electronic Cigarette/Vaping:        Electronic Cigarette Use: Never.   (Last Updated: 11/20/2020 2:25:13 PM CST by Rylie Kelly CMA)          Substance Abuse:  Denies Substance Abuse      Never   (Last Updated: 10/11/2012 10:00:20 AM CDT by Thu Todd LPN)          Employment/School:        Work/School description: LPN.  Highest education level: Associate degree.   Comments:  10/11/2012 10:00 AM - Thu Todd LPN: Works part time.   (Last Updated: 10/3/2019 3:13:06 PM CDT by Jeanette Rudolph)          Home/Environment:        Marital status: .  Spouse/Partner name: Topher.  Living situation: Home/Independent.  Injuries/Abuse/Neglect in household: No.  Agency(s)/Others notified: No.  Family/Friends available for support: Yes.  Risks in environment: Pool/Gaming, Stairs.   (Last Updated: 10/3/2019 3:12:36 PM CDT by Jeanette Rudolph)          Nutrition/Health:        Type of diet: Regular.   (Last  Updated: 10/11/2012 10:00:44 AM CDT by Thu Todd LPN)          Exercise:        Exercise type: Walking.   Comments:  10/11/2012 10:00 AM - Thu Todd LPN: Occasional   (Last Updated: 10/31/2012 12:34:01 PM CDT by Jeanette Rudolph)          Sexual:        Sexually active: Yes.  Sexual orientation: Heterosexual.   (Last Updated: 10/11/2012 10:00:58 AM CDT by Thu Todd LPN)   Identifies as female, History of STD: No.  Contraceptive Use Details: None.  History of sexual abuse: No.   (Last Updated: 10/3/2019 3:13:37 PM CDT by Jeanette Rudolph)          Other:        First menses age 12.  Regular menses.  Menstrual duration 10-14 days.  Cycle interval 28 days.  No history of abnormal Pap smear.   (Last Updated: 10/31/2012 12:29:31 PM CDT by Jeanette Rudolph)

## 2022-02-16 NOTE — TELEPHONE ENCOUNTER
"---------------------  From: Christine Tong MA (Phone Messages Pool ([id number]_BRENDA Patel))   To: Landon Mcdonald MD;     Sent: 11/20/2019 10:04:50 AM CST  Subject: Phone Note: Depression     PCP:   UZAIR      Time of Call:  9\"50       Person Calling:  Carolann  Phone number:  [phone] ok to     Returned call at: _    Note:   Patient called stating she feels like she needs to try a different medication, states she did quit smoking but is now having a lot of anxiety. States she has probably only slept 8 hours in the past 5 days, just feels like she cannot shut her brain off. Is also still having issues with agitation and being emotional. Patient is willing to add another medication on to current one or switch completely. She is fine with what ever you think would work. She is willing to come in and see you if you want her to.     [facility]     Last office visit and reason:  9/16/19    Transferred to: [facility]  ** Submitted: **  Order:buPROPion (Wellbutrin  mg/24 hours oral tablet, extended release)  1 tab(s)  Oral  q 24 hrs  decreased dose  Qty:  30 tab(s)        Refills:  0          Substitutions Allowed     Route To Pharmacy - [facility]    Signed by Landon Mcdonald MD  11/20/2019 10:26:00 AM  ** Submitted: **  Order:FLUoxetine (FLUoxetine 20 mg oral capsule)  1 cap(s)  Oral  daily  Qty:  30 cap(s)        Refills:  0          Substitutions Allowed     Route To Pharmacy - [facility]    Signed by Landon Mcdonald MD  11/20/2019 10:28:00 AMCut back wellbutrin to 150mg and add fluoxetine 20mg tabs, then schedule appointment to see me in 2 weeks so we can see how she is doing and adjust from there.  Happy to see her sooner if she would like a visit. Thanks---------------------  From: Landon Mcdonald MD   To: Phone Messages [employer] ([id number]_BRENDA Patel);     Sent: 11/20/2019 10:29:21 AM CST  Subject: RE: Phone Note: DepressionLeft message letting patient know.  "

## 2022-02-16 NOTE — PROGRESS NOTES
Patient:   KM FRIEDMAN            MRN: [id number]            FIN: [id number]               Age:   32 years     Sex:  Female     :  1985   Associated Diagnoses:   None   Author:   Rajinder Tee MD       -   Today's date:  2017 10:13:00 AM .        -   To whom it may concern:        This patient is currently under my care and Was seen in my office on  2017.  .     Please excuse him/ her from work, for the next  4  days.  The patient is scheduled for follow-up care on  2017 10:13:00 AM.  Please contact me if you have any questions or concerns.      -   Sincerely,             Rajinder Tee MD  [address]  [address] [phone]

## 2022-02-16 NOTE — LETTER
(Inserted Image. Unable to display)   August 05, 2021  KM BR[address]CE   [address] [address]        Dear KM,    Thank you for selecting [facility] for your healthcare needs.    Our records indicate you are due for the following services:     Medication Check      (FYI   Regarding office visits: In some instances, a video visit or telephone visit may be offered as an option.)    To schedule an appointment or if you have further questions, please contact your clinic at [phone].    Powered by [employer]    Sincerely,    Rajinder Tee MD

## 2022-02-16 NOTE — PROGRESS NOTES
Patient:   KM FRIEDMAN            MRN: [id number]            FIN: [id number]               Age:   32 years     Sex:  Female     :  1985   Associated Diagnoses:   None   Author:   Rajinder Tee MD       -   Today's date:  2017 8:52:00 AM .        -   To whom it may concern:        This patient is currently under my care and Was seen in my office on  2017.  .  He/ she may return to work, on  2017, without restrictions.  Follow up as needed   Please contact me if you have any questions or concerns.      -   Sincerely,             Rajinder Tee MD  [address]  [address] [phone]

## 2022-02-16 NOTE — NURSING NOTE
Depression Screening Entered On:  12/11/2020 9:40 AM CST    Performed On:  11/20/2020 9:39 AM CST by Ana Maria Padilla               Depression Screening   Little Interest - Pleasure in Activities :   Nearly every day   Feeling Down, Depressed, Hopeless :   Nearly every day   Initial Depression Screen Score :   6 Score   Poor Appetite or Overeating :   Nearly every day   Trouble Falling or Staying Asleep :   Nearly every day   Feeling Tired or Little Energy :   Nearly every day   Feeling Bad About Yourself :   More than half the days   Trouble Concentrating :   Nearly every day   Moving or Speaking Slowly :   More than half the days   Thoughts Better Off Dead or Hurting Self :   Not at all   EITAN Difficulty with Work, Home, Others :   Very difficult   Detailed Depression Screen Score :   16    Total Depression Screen Score :   22    Ana Maria Padilla - 12/11/2020 9:39 AM CST

## 2022-02-16 NOTE — PROCEDURES
Accession Number:       [id number]  CLINICAL INFORMATION::     Normal exam  LMP::     01/15/2017  PREV. PAP::     10/11/2012  PREV. BX::     NEGATIVE  SOURCE::     Cervix, Endocervix  STATEMENT OF ADEQUACY::     Satisfactory for evaluation. Endocervical/transformation zone component present.  INTERPRETATION/RESULT::     Negative for intraepithelial lesion or malignancy.  COMMENT::     This Pap test has been evaluated with computer assisted technology.  CYTOTECHNOLOGIST::     MARCK CT(ASCP) CT Screening location: [place] [address]  REVIEW CYTOTECHNOLOGIST::     BRUNO CT(ASCP) CT Screening location: [place] [address]  HPV mRNA E6/E7:     Detected       This test was performed using the APTIMA HPV Assay ([employer]       This assay detects E6/E7 viral messenger RNA (mRNA) from 14       high-risk HPV types (16,18,31,33,35,39,45,51,52,56,58,59,66,68).

## 2022-02-16 NOTE — TELEPHONE ENCOUNTER
---------------------  From: Bren Olson CMA (Phone Messages Pool ([id number]_[place] - [place]))   To: Landon Mcdonald MD;     Sent: 10/16/2019 2:11:38 PM CDT  Subject: Phone Note: F/U Depression      Phone Message    PCP:   MARIA GUADALUPE      Time of Call:  12:17 pm    Phone number:  [phone]    Returned call at: n/a    Note:   Pt called stating that she thinks her dose of Wellbutrin needs to be increased. She was seen on 9/16/19 for annual exam & discussed increase depression and tobacco use. Decided to try wellbutrin again and started on 150 mg daily. She states that she doesn't feel it is taking the anxiety or edge away. Is thinking about getting a nicotine patch to help with quitting smoking as well. Has been taking for one month now and at her visit you advised her to call in one month to discuss whether she needed an increase in dose. Please advise.    Pharmacy: [facility]    Last office visit and reason: 9/16/19; px     Transferred to: MARIA GUADALUPE  ** Submitted: **  Order:buPROPion (Wellbutrin  mg/24 hours oral tablet, extended release)  1 tab(s)  Oral  q 24 hrs  Qty:  30 tab(s)        Refills:  5          Substitutions Allowed     Route To Pharmacy - [facility]    Signed by Landon Mcdonald MD  10/16/2019 2:20:00 PMwill send in 300mg dose, if not effective should follow up in clinic to discuss next steps---------------------  From: Landon Mcdonald MD   To: Phone Messages Pool ([id number]_[place] Corey Patel);     Sent: 10/16/2019 2:21:34 PM CDT  Subject: RE: Phone Note: F/U DepressionReturned Call  Time: 2:26 pm  Note:  Called & left a detailed message per patients request letting her know of the increased dose and to have her call us in 4 weeks letting us know how she is doing on medication. If she is not doing well we will have her f/u in clinic with MARIA GUADALUPE.

## 2022-02-16 NOTE — NURSING NOTE
Comprehensive Intake Entered On:  9/16/2019 1:10 PM CDT    Performed On:  9/16/2019 1:04 PM CDT by Rhiannon Mccall CMA               Summary   Chief Complaint :   Phyiscal    Last Menstrual Period :   9/9/2019 CDT   Menstrual Status :   Premenarcheal   Weight Measured :   207.0 lb(Converted to: 207 lb 0 oz, 93.89 kg)    Height Measured :   62.75 in(Converted to: 5 ft 3 in, 159.38 cm)    Body Mass Index :   36.96 kg/m2 (HI)    Body Surface Area :   2.04 m2   Systolic Blood Pressure :   104 mmHg   Diastolic Blood Pressure :   64 mmHg   Mean Arterial Pressure :   77 mmHg   Peripheral Pulse Rate :   78 bpm   Oxygen Saturation :   97 %   Rhiannon Mccall CMA - 9/16/2019 1:04 PM CDT   Health Status   Allergies Verified? :   Yes   Medication History Verified? :   Yes   Immunizations Current :   Yes   Medical History Verified? :   Yes   Pre-Visit Planning Status :   Completed   Tobacco Use? :   Current every day smoker   Tobacco Cessation Review :   Not ready to quit   Rhiannon Mccall CMA - 9/16/2019 1:04 PM CDT   Consents   Consent for Immunization Exchange :   Consent Granted   Consent for Immunizations to Providers :   Consent Granted   Rhiannon Mccall CMA - 9/16/2019 1:04 PM CDT   Meds / Allergies   (As Of: 9/16/2019 1:10:30 PM CDT)   Allergies (Active)   No Known Medication Allergies  Estimated Onset Date:   Unspecified ; Created By:   Carolann Sidhu CMA; Reaction Status:   Active ; Category:   Drug ; Substance:   No Known Medication Allergies ; Type:   Allergy ; Updated By:   Carolann Sidhu CMA; Reviewed Date:   9/16/2019 1:08 PM CDT        Medication List   (As Of: 9/16/2019 1:10:30 PM CDT)   No Known Home Medications     Rhiannon Mccall CMA - 9/16/2019 1:08:56 PM      Prescription/Discharge Order    azithromycin  :   azithromycin ; Status:   Completed ; Ordered As Mnemonic:   Zithromax 250 mg oral tablet ; Simple Display Line:   1 packet(s), PO, Once, as directed on package labeling. Two tablets po on day one, then one tablet  daily x four days, 6 tab(s), 0 Refill(s) ; Ordering Provider:   Bhavik Epps PA-C; Catalog Code:   azithromycin ; Order Dt/Tm:   11/20/2017 9:56:08 AM

## 2022-02-16 NOTE — PROGRESS NOTES
Patient:   KM FRIEDMAN            MRN: [id number]            FIN: [id number]               Age:   32 years     Sex:  Female     :  1985   Associated Diagnoses:   None   Author:   Rajinder Tee MD       -   Today's date:  2017 9:04:00 AM .        -   To whom it may concern:        This patient is currently under my care and Was seen in my office on  2017.  .  He/ she may return to work, on  2017, with the following restrictions: desk work only, for 2 weeks.  The patient will need follow-up care in  2  weeks.  Please contact me if you have any questions or concerns.      -   Sincerely,             Rajinder Tee MD  [address]  [address] [phone]

## 2022-02-16 NOTE — TELEPHONE ENCOUNTER
---------------------  From: Bren Olson CMA (Phone Messages Pool ([id number]_[place] - [place])   To: Landon Mcdonald MD;     Sent: 3/30/2020 10:32:12 AM CDT  Subject: Phone Note: Return to Work Note     Phone Message    PCP:   KAH      Time of Call:  9:29 am    Phone number:  [phone]    Returned call at: 10:25 am    Note:   Pt called requesting a return to work note. Was tested last week for COVID-19 and result came back negative. States that she has been symptoms free for four days other than a runny nose which she believes is allergy symptoms. 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 Please advise.    [employer]  Fax: [phone]  Attn: Ilene    Pharmacy: n/a    Last office visit and reason: 3/24/20; Covid testing    Transferred to: [employer]rinted and signed---------------------  From: Landon Mcdonald MD   To: Phone Messages [employer] ([id number]_[place] - [place]);     Sent: 3/30/2020 10:42:19 AM CDT  Subject: RE: Phone Note: Return to Work NoteReturned Call  Time: 10:49 am  Note:  Note was faxed to [employer] with confirmation. Called & notified pt that this was done.

## 2022-02-16 NOTE — TELEPHONE ENCOUNTER
---------------------  From: Naomi Redmond CMA (Phone Messages Pool ([id number]_[facility]))   To: [facility] Message Pool ([id number]_[facility]);     Sent: 12/18/2020 12:51:43 PM CST  Subject: Prozac      Phone Message    PCP:   UZAIR asked for CHT      Time of Call:  1239       Person Calling:  Pt  Phone number:  [phone]    Returned call at: _    Note:   Calls with update on Prozac medication started at her recent physical with CHT patient hasn't noticed a change or any results. Wonders if the dose needs to be increased or change to a different medication. Please advise.    Last office visit and reason:  11/20/20 annual physical, depression CHT---------------------  From: Rylie Kelly CMA ([employer] Message Pool ([id number]))   To: Rajinder Tee MD;     Sent: 12/18/2020 1:11:00 PM CST  Subject: FW: Prozac  ** Submitted: **  Order:OLANZapine (OLANZapine 2.5 mg oral tablet)  1 tab(s)  Oral  hs  Qty:  30 tab(s)        Refills:  0          Substitutions Allowed     Route To [facility]    Signed by Rajinder Tee MD  12/18/2020 8:47:00 PM UT    ** Submitted: **  Order:FLUoxetine (FLUoxetine 20 mg oral capsule)  1 cap(s)  Oral  daily  Qty:  30 cap(s)        Refills:  0          Substitutions Allowed     Route To [facility]    Signed by Rajinder Tee MD  12/18/2020 8:47:00 PM UT---------------------  From: Rajinder Tee MD   To: [facility] Message Pool ([id number]_[facility]);     Sent: 12/18/2020 2:48:59 PM CST  Subject: RE: Prozac      called and advised of changes.

## 2022-02-16 NOTE — PROGRESS NOTES
Patient:   KM FRIEDMAN            MRN: [id number]            FIN: [id number]               Age:   32 years     Sex:  Female     :  1985   Associated Diagnoses:   Neck strain   Author:   Rajinder Tee MD      Chief Complaint   2017 9:48 AM CDT    Work Comp c/o neck pain x Friday     Chief complaint and symptoms noted above confirmed with patient.      History of Present Illness             The patient presents with neck pain.  The location of the neck pain is the right.  The neck pain is described as aching and burning.  The severity of the neck pain is moderate.  The neck pain is constant.  The neck pain has lasted for 5 day(s).  Radiation of pain: to the right upper extremity.  IBU not helping.  Exacerbating factors consist of changing position and turning head.  Relieving factors consist of analgesics.        Review of Systems   Constitutional:  Negative.    Musculoskeletal:  Negative except as documented in history of present illness.       Health Status   Allergies:    Allergies reviewed.   Medications: reviewed   Problem list: Reviewed      Histories   Past Medical History:    Past medical history reviewed.   Family History: Noncontributory   Procedure history:    Procedure history reviewed.   Social History: Reviewed      Physical Examination   Vital Signs   2017 9:48 AM CDT Temperature Tympanic 97.1 DegF  LOW    Peripheral Pulse Rate 72 bpm    Pulse Site Radial artery    HR Method Manual    Systolic Blood Pressure 116 mmHg    Diastolic Blood Pressure 70 mmHg    Mean Arterial Pressure 85 mmHg    BP Site Left arm    BP Method Manual      General:  Alert and oriented, No acute distress.    Musculoskeletal:       Spine/torso exam: Cervical ( Right, Moderate, Tenderness, Pain, No numbness, No tingling, Strength  5  out of 5, Range of motion ( Restricted by pain ) ).    Neurologic:  Alert, Oriented, Normal sensory, Normal motor function.    Psychiatric:  Cooperative, Appropriate mood &  affect.       Review / Management   Results review:  No X-ray at this time, no history of trauma.       Impression and Plan   Diagnosis     Neck strain ([id number] S16.1XXA).     Orders     Orders   Pharmacy:  predniSONE 10 mg oral tablet (Prescribe): See Instructions, Instructions: 4 tabs daily for 4 days then 3 tabs daily for 4 days then 2 tabs daily for 4 days then 1 tab daily for 4 days then 1/2 tab daily for 4 days, # 42 tab(s), 0 Refill(s), Type: Maintenance, Pharmacy: [id number], 4 tabs daily for 4 days then 3 tabs daily for 4 days then 2 tabs daily for 4 days then 1 tab daily for 4 days then 1/2 tab daily for 4 days.     Orders   Pharmacy:  tiZANidine 2 mg oral capsule (Prescribe): 1 cap(s) ( 2 mg ), po, q8 hrs, PRN: as needed for muscle spasm, # 30 cap(s), 0 Refill(s), Type: Maintenance, Pharmacy: [id number], 1 cap(s) po q8 hrs,PRN:as needed for muscle spasm.     Will recheck Friday.  No work until then..        Professional Services   Counseling Summary:  This was a 25 minute visit with greater than 50% of that time spent counseling the patient.

## 2022-02-16 NOTE — NURSING NOTE
PCP:   UZAIR     Time of Call:  10:000       Person Calling:  Carolann  Phone number:  [phone]    Returned call at: Spoke with patient    Note:   Per last note patient is to make appointment to see Dr. Mcdonald after changing medication. Patient scheduled appointment but needed refills to get her to appointment. Sent in 30 days supply of both.     Pharmacy: [facility]    Last office visit and reason:  _    Transferred to: N/a

## 2022-02-16 NOTE — TELEPHONE ENCOUNTER
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 On recent trip with confirmed COVID-19 positive community members. Now has cough, shortness of breath, feels feverish. Symptoms filled out on PUI form. [facility] requesting testing given Tier 2 status. Curbside testing done by me in full PPE without disruption. Kit is sent to [facility] for testing. Patient will quarantine at home. [facility] will be notified.

## 2022-02-16 NOTE — LETTER
(Inserted Image. Unable to display)   December 23, 2020        KM BRUCE   [address] [place], [place] [id number]        Dear KM,      Thank you for selecting [facility] for your healthcare needs.    Our records indicate you are due for the following services:     Follow-up office visit     (FYI   Regarding office visits: In some instances, a video visit or telephone visit may be offered as an option.)        To schedule an appointment or if you have further questions, please contact your clinic at [phone].      Powered by [employer]    Sincerely,    Rajinder Tee M.D.

## 2022-02-16 NOTE — LETTER
(Inserted Image. Unable to display)         September 24, 2020        KM BRUCE   [address] [place], [place] [id number]        Dear KM,    Thank you for selecting [facility] for your healthcare needs.    Our records indicate you are due for the following services:     Annual Physical     To schedule an appointment or if you have further questions, please contact your primary clinic:   [facility]       [phone]   [facility]       [phone]              [facility]     [phone]      Powered by [employer]    Sincerely,    Landon Mcdonald MD

## 2022-02-16 NOTE — PROCEDURES
Accession Number:       [id number]  CLINICAL INFORMATION::     None given  LMP::     9/9/19  PREV. PAP::     2/17/17  PREV. BX::     NONE GIVEN  SOURCE::     [facility]  STATEMENT OF ADEQUACY::     Satisfactory for evaluation. Endocervical/transformation zone component absent.  INTERPRETATION/RESULT::     Negative for intraepithelial lesion or malignancy.  COMMENT::     This Pap test has been evaluated with computer assisted technology.  CYTOTECHNOLOGIST::     NAYELI BRAVO(ASCP) CT Screening location: [place] [address]  REVIEW CYTOTECHNOLOGIST::     NAYELI GARCIA(ASCP) CT Screening location: [place] [address]  COMMENT:     See comment       EXPLANATORY NOTE:         The Pap is a screening test for cervical cancer. It is       not a diagnostic test and is subject to false negative       and false positive results. It is most reliable when a       satisfactory sample, regularly obtained, is submitted       with relevant clinical findings and history, and when       the Pap result is evaluated along with historic and       current clinical information.  HPV mRNA E6/E7:     Not Detected       This test was performed using the APTIMA HPV Assay ([employer]       This assay detects E6/E7 viral messenger RNA (mRNA) from 14       high-risk HPV types (16,18,31,33,35,39,45,51,52,56,58,59,66,68).         The analytical performance characteristics of       this assay have been determined by [employer]. The modifications have not been       cleared or approved by the FDA. This assay has       been validated pursuant to the CLIA regulations       and is used for clinical purposes.

## 2022-03-02 NOTE — LETTER
(Inserted Image. Unable to display)   February 07, 2022  KM BRUCE   [address] [address]        Dear KM,    Thank you for selecting [facility] for your healthcare needs.    Our records indicate you are due for the following services:     Annual Physical     (FYI   Regarding office visits: In some instances, a video visit or telephone visit may be offered as an option.)    To schedule an appointment or if you have further questions, please contact your clinic at [phone].    Powered by [employer]    Sincerely,    Rajinder Tee MD

## 2024-01-29 ENCOUNTER — TELEPHONE (OUTPATIENT)
Dept: FAMILY MEDICINE | Facility: CLINIC | Age: 39
End: 2024-01-29

## 2024-02-19 ENCOUNTER — OFFICE VISIT (OUTPATIENT)
Dept: FAMILY MEDICINE | Facility: CLINIC | Age: 39
End: 2024-02-19
Payer: COMMERCIAL

## 2024-02-19 VITALS
WEIGHT: 211 LBS | RESPIRATION RATE: 16 BRPM | DIASTOLIC BLOOD PRESSURE: 74 MMHG | OXYGEN SATURATION: 96 % | BODY MASS INDEX: 37.39 KG/M2 | SYSTOLIC BLOOD PRESSURE: 120 MMHG | TEMPERATURE: 98 F | HEART RATE: 85 BPM | HEIGHT: 63 IN

## 2024-02-19 DIAGNOSIS — Z12.4 SCREENING FOR CERVICAL CANCER: ICD-10-CM

## 2024-02-19 DIAGNOSIS — Z13.6 SCREENING FOR CARDIOVASCULAR CONDITION: ICD-10-CM

## 2024-02-19 DIAGNOSIS — Z13.1 SCREENING FOR DIABETES MELLITUS: ICD-10-CM

## 2024-02-19 DIAGNOSIS — F33.1 MODERATE RECURRENT MAJOR DEPRESSION (H): ICD-10-CM

## 2024-02-19 DIAGNOSIS — R63.5 WEIGHT GAIN: ICD-10-CM

## 2024-02-19 DIAGNOSIS — Z12.31 VISIT FOR SCREENING MAMMOGRAM: Primary | ICD-10-CM

## 2024-02-19 DIAGNOSIS — Z00.00 ROUTINE GENERAL MEDICAL EXAMINATION AT A HEALTH CARE FACILITY: ICD-10-CM

## 2024-02-19 LAB
CHOLEST SERPL-MCNC: 234 MG/DL
FASTING STATUS PATIENT QL REPORTED: NO
HBA1C MFR BLD: 5.9 % (ref 0–5.6)
HDLC SERPL-MCNC: 63 MG/DL
LDLC SERPL CALC-MCNC: 102 MG/DL
NONHDLC SERPL-MCNC: 171 MG/DL
TRIGL SERPL-MCNC: 346 MG/DL
TSH SERPL DL<=0.005 MIU/L-ACNC: 2.22 UIU/ML (ref 0.3–4.2)

## 2024-02-19 PROCEDURE — 84443 ASSAY THYROID STIM HORMONE: CPT | Performed by: FAMILY MEDICINE

## 2024-02-19 PROCEDURE — 36415 COLL VENOUS BLD VENIPUNCTURE: CPT | Performed by: FAMILY MEDICINE

## 2024-02-19 PROCEDURE — G0145 SCR C/V CYTO,THINLAYER,RESCR: HCPCS | Performed by: FAMILY MEDICINE

## 2024-02-19 PROCEDURE — 83036 HEMOGLOBIN GLYCOSYLATED A1C: CPT | Performed by: FAMILY MEDICINE

## 2024-02-19 PROCEDURE — 87624 HPV HI-RISK TYP POOLED RSLT: CPT | Performed by: FAMILY MEDICINE

## 2024-02-19 PROCEDURE — 99386 PREV VISIT NEW AGE 40-64: CPT | Performed by: FAMILY MEDICINE

## 2024-02-19 PROCEDURE — 99214 OFFICE O/P EST MOD 30 MIN: CPT | Mod: 25 | Performed by: FAMILY MEDICINE

## 2024-02-19 PROCEDURE — 80061 LIPID PANEL: CPT | Performed by: FAMILY MEDICINE

## 2024-02-19 RX ORDER — BUPROPION HYDROCHLORIDE 150 MG/1
150 TABLET ORAL EVERY MORNING
Qty: 90 TABLET | Refills: 4 | Status: SHIPPED | OUTPATIENT
Start: 2024-02-19

## 2024-02-19 SDOH — HEALTH STABILITY: PHYSICAL HEALTH: ON AVERAGE, HOW MANY DAYS PER WEEK DO YOU ENGAGE IN MODERATE TO STRENUOUS EXERCISE (LIKE A BRISK WALK)?: 0 DAYS

## 2024-02-19 ASSESSMENT — SOCIAL DETERMINANTS OF HEALTH (SDOH): HOW OFTEN DO YOU GET TOGETHER WITH FRIENDS OR RELATIVES?: ONCE A WEEK

## 2024-02-19 ASSESSMENT — PATIENT HEALTH QUESTIONNAIRE - PHQ9
SUM OF ALL RESPONSES TO PHQ QUESTIONS 1-9: 11
10. IF YOU CHECKED OFF ANY PROBLEMS, HOW DIFFICULT HAVE THESE PROBLEMS MADE IT FOR YOU TO DO YOUR WORK, TAKE CARE OF THINGS AT HOME, OR GET ALONG WITH OTHER PEOPLE: SOMEWHAT DIFFICULT
SUM OF ALL RESPONSES TO PHQ QUESTIONS 1-9: 11

## 2024-02-19 NOTE — PROGRESS NOTES
Preventive Care Visit  [facility]  Landon Mcdonald MD, Family Medicine  Feb 19, 2024    Assessment & Plan     Routine general medical examination at a health care facility  Health maintenance updated, preventive services reviewed, vaccines discussed, questions are answered, patient encouraged to continue annual wellness visits to review preventive services    Visit for screening mammogram  Recommend mammogram, she can go to the facility of her choice.    Screening for diabetes mellitus  Labs pending  - Hemoglobin A1c; Future  - Hemoglobin A1c    Screening for cardiovascular condition  Labs pending  - Lipid panel reflex to direct LDL Non-fasting; Future  - Lipid panel reflex to direct LDL Non-fasting    Moderate recurrent major depression (H)  Exacerbation of depression triggered by daughter leaving for college.  She does not feel like counseling would be helpful at this time but will consider in the future if not getting better.  Will start bupropion which has been helpful for her in the past for mood disorder.  Follow-up in 4 to 6 weeks if not seeing improvement.  - buPROPion (WELLBUTRIN XL) 150 MG 24 hr tablet; Take 1 tablet (150 mg) by mouth every morning    Weight gain  Noting weight gain, BMI of 37 without any additional complications.  Check thyroid  - TSH with free T4 reflex; Future  - TSH with free T4 reflex    Screening for cervical cancer  Pap smear completed today  - Pap screen with HPV - recommended age 30 - 65 years                Nicotine/Tobacco Cessation  She reports that she has been smoking cigarettes. She has been exposed to tobacco smoke. She has never used smokeless tobacco.  Nicotine/Tobacco Cessation Plan  Patient previously found bupropion to be helpful for decreasing urge to smoke.  Will let us know if that is not adequate.  Currently only smoking 2 to 3 cigarettes a day.      BMI  Estimated body mass index is 37.98 kg/m  as calculated from the following:     "Height as of this encounter: 1.588 m (5' 2.5\").    Weight as of this encounter: 95.7 kg (211 lb).       Depression Screening Follow Up        2/19/2024    12:34 PM   PHQ   PHQ-9 Total Score 11   Q9: Thoughts of better off dead/self-harm past 2 weeks Not at all         Follow Up Actions Taken  Started patient on anti-depressant.     Counseling  Appropriate preventive services were discussed with this patient, including applicable screening as appropriate for fall prevention, nutrition, physical activity, Tobacco-use cessation, weight loss and cognition.  Checklist reviewing preventive services available has been given to the patient.  Reviewed patient's diet, addressing concerns and/or questions.   The patient was instructed to see the dentist every 6 months.   The patient's PHQ-9 score is consistent with moderate depression. She was provided with information regarding depression.             Elfego Vuong is a 39 year old, presenting for the following:  Physical (She would like to quit smoking. )        2/19/2024    12:48 PM   Additional Questions   Roomed by [facility] Directive  Patient does not have a Health Care Directive or Living Will: Discussed advance care planning with patient; information given to patient to review.    HPI  Here for annual exam.  Also struggling with depression.  Daughter moved away to college in the fall and she has had a very hard time with the empty house.  Picked up a second job to keep yourself busy.  Whenever her daughter comes home to visit she struggles for days after her daughter leaves.  Notes that it disrupts sleep.            2/19/2024   General Health   How would you rate your overall physical health? (!) FAIR   Feel stress (tense, anxious, or unable to sleep) Rather much   (!) STRESS CONCERN      2/19/2024   Nutrition   Three or more servings of calcium each day? Yes   Diet: Regular (no restrictions)   How many servings of fruit and vegetables per day? " (!) 0-1   How many sweetened beverages each day? 0-1         2/19/2024   Exercise   Days per week of moderate/strenous exercise 0 days   (!) EXERCISE CONCERN      2/19/2024   Social Factors   Frequency of gathering with friends or relatives Once a week   Worry food won't last until get money to buy more No   Food not last or not have enough money for food? No   Do you have housing?  Yes   Are you worried about losing your housing? No   Lack of transportation? No   Unable to get utilities (heat,electricity)? No         2/19/2024   Dental   Dentist two times every year? (!) NO         2/19/2024   TB Screening   Were you born outside of [place]?  No       Today's PHQ-9 Score:       2/19/2024    12:34 PM   PHQ-9 SCORE   PHQ-9 Total Score MyChart 11 (Moderate depression)   PHQ-9 Total Score 11         2/19/2024   Substance Use   Alcohol more than 3/day or more than 7/wk No   Do you use any other substances recreationally? No     Social History     Tobacco Use    Smoking status: Some Days     Packs/day: 0.00     Years: 20.00     Additional pack years: 0.00     Total pack years: 0.00     Types: Cigarettes     Passive exposure: Current    Smokeless tobacco: Never   Vaping Use    Vaping Use: Never used   Substance Use Topics    Alcohol use: Yes    Drug use: Never             2/19/2024   Breast Cancer Screening   Family history of breast, colon, or ovarian cancer? No / Unknown              2/19/2024   STI Screening   New sexual partner(s) since last STI/HIV test? No     History of abnormal Pap smear: NO - age 30-65 PAP every 5 years with negative HPV co-testing recommended       The ASCVD Risk score (Quan TUBBS, et al., 2019) failed to calculate for the following reasons:    Cannot find a previous HDL lab    Cannot find a previous total cholesterol lab        2/19/2024   Contraception/Family Planning   Questions about contraception or family planning No        Reviewed and updated as needed this visit by Provider   Luciano   "Allergies  Meds  Problems  Med Hx  Surg Hx  Fam Hx                     Objective    Exam  /74 (BP Location: Right arm, Patient Position: Sitting)   Pulse 85   Temp 98  F (36.7  C) (Tympanic)   Resp 16   Ht 1.588 m (5' 2.5\")   Wt 95.7 kg (211 lb)   LMP 02/16/2024   SpO2 96%   BMI 37.98 kg/m     Estimated body mass index is 37.98 kg/m  as calculated from the following:    Height as of this encounter: 1.588 m (5' 2.5\").    Weight as of this encounter: 95.7 kg (211 lb).    Physical Exam  GENERAL: alert and no distress  EYES: Eyes grossly normal to inspection, PERRL and conjunctivae and sclerae normal  HENT: ear canals and TM's normal, nose and mouth without ulcers or lesions  NECK: no adenopathy, no asymmetry, masses, or scars  RESP: lungs clear to auscultation - no rales, rhonchi or wheezes  CV: regular rate and rhythm, normal S1 S2, no S3 or S4, no murmur, click or rub, no peripheral edema  ABDOMEN: soft, nontender, no hepatosplenomegaly, no masses and bowel sounds normal   (female) w/bimanual: normal female external genitalia, normal urethral meatus, normal vaginal mucosa, and normal cervix/adnexa/uterus without masses or discharge  MS: no gross musculoskeletal defects noted, no edema  SKIN: no suspicious lesions or rashes  NEURO: Normal strength and tone, mentation intact and speech normal  PSYCH: mentation appears normal, affect normal/bright      Signed Electronically by: Landon Mcdonald MD    Answers submitted by the patient for this visit:  Patient Health Questionnaire (Submitted on 2/19/2024)  If you checked off any problems, how difficult have these problems made it for you to do your work, take care of things at home, or get along with other people?: Somewhat difficult  PHQ9 TOTAL SCORE: 11    "

## 2024-02-19 NOTE — COMMUNITY RESOURCES LIST (ENGLISH)
02/19/2024    [employer]  N/A  For questions about this resource list or additional care needs, please contact your primary care clinic or care manager.  Phone: [phone]   Email: N/A   Address: [address] [place] [id number]   Hours: N/A        Exercise and Recreation       Gym or workout facility  1  [employer] Distance: 3.9 miles      In-Person   [address] [place] [address]  Language: English  Hours: Mon - Thu 5:00 AM - 8:30 PM , Fri 5:00 AM - 7:00 PM , Sat - Sun 7:00 AM - 12:00 PM  Fees: Insurance, Self Pay, Sliding Fee   Phone: [phone] Website: [email or web address].[email or web address]     2  [employer] [place] Distance: 5.54 miles      In-Person   [address] [place] [address]  Language: English  Hours: Mon - Sun Open 24 Hours  Fees: Insurance, Self Pay, Sliding Fee   Phone: [phone] Email: [email or web address]@[email or web address] Website: [email or web address]          Important Numbers & Websites       Emergency Services   911  [facility] Services   311  Poison Control   (924) 849-3100  Suicide Prevention Lifeline   (706) 282-1333 (TALK)  Child Abuse Hotline   (387) 671-8192 (4-A-Child)  Sexual Assault Hotline   (778) 537-8498 (HOPE)  National Runaway Safeline   (540) 299-4904 (RUNAWAY)  All-Options Talkline   (931) 457-7747  Substance Abuse Referral   (305) 183-9512 (HELP)

## 2024-02-20 ENCOUNTER — MYC MEDICAL ADVICE (OUTPATIENT)
Dept: FAMILY MEDICINE | Facility: CLINIC | Age: 39
End: 2024-02-20
Payer: COMMERCIAL

## 2024-02-20 DIAGNOSIS — E78.1 HYPERTRIGLYCERIDEMIA: Primary | ICD-10-CM

## 2024-02-20 DIAGNOSIS — R73.03 PREDIABETES: ICD-10-CM

## 2024-02-22 LAB
BKR LAB AP GYN ADEQUACY: NORMAL
BKR LAB AP GYN INTERPRETATION: NORMAL
BKR LAB AP HPV REFLEX: NORMAL
BKR LAB AP LMP: NORMAL
BKR LAB AP PREVIOUS ABNORMAL: NORMAL
PATH REPORT.COMMENTS IMP SPEC: NORMAL
PATH REPORT.COMMENTS IMP SPEC: NORMAL
PATH REPORT.RELEVANT HX SPEC: NORMAL

## 2024-02-27 PROBLEM — R87.810 CERVICAL HIGH RISK HPV (HUMAN PAPILLOMAVIRUS) TEST POSITIVE: Status: ACTIVE | Noted: 2024-02-27

## 2024-02-27 LAB
HUMAN PAPILLOMA VIRUS 16 DNA: NEGATIVE
HUMAN PAPILLOMA VIRUS 18 DNA: NEGATIVE
HUMAN PAPILLOMA VIRUS FINAL DIAGNOSIS: NORMAL
HUMAN PAPILLOMA VIRUS OTHER HR: NEGATIVE

## 2024-03-04 ENCOUNTER — OFFICE VISIT (OUTPATIENT)
Dept: EDUCATION SERVICES | Facility: CLINIC | Age: 39
End: 2024-03-04
Attending: FAMILY MEDICINE
Payer: COMMERCIAL

## 2024-03-04 VITALS — BODY MASS INDEX: 37.63 KG/M2 | WEIGHT: 212.4 LBS | HEIGHT: 63 IN

## 2024-03-04 DIAGNOSIS — E78.1 HYPERTRIGLYCERIDEMIA: ICD-10-CM

## 2024-03-04 DIAGNOSIS — R73.03 PREDIABETES: Primary | ICD-10-CM

## 2024-03-04 DIAGNOSIS — E66.9 OBESITY: ICD-10-CM

## 2024-03-04 PROCEDURE — 97802 MEDICAL NUTRITION INDIV IN: CPT | Performed by: DIETITIAN, REGISTERED

## 2024-03-04 NOTE — LETTER
"    3/4/2024         RE: Carolann Parker   [address] [facility] [address]        Dear Colleague,    Thank you for referring your patient, Carolann Parker, to the [facility]. Please see a copy of my visit note below.    Medical Nutrition Therapy  Visit Type:Initial assessment and intervention    Carolann Parker presents today for MNT and education related to prediabetes, weight management for class 2 obesity Body mass index is 38.23 kg/m ., and hyperlipidemia.   She is accompanied by self.     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  Patient was on location week prior to lab testing in [place] and did consume much  more than normal sugar sweetened alcoholic beverages which may have contributed some to elevated cholesterol, triglyceride and A1c results.  NUTRITION HISTORY:  Patient does try to cook and meal prep every week.  Patient tries to eat \"clean\" high amount all nonstarchy vegetables lean protein.  Does not consume fruit as much as vegetables and tries to limit starches and consumes low carbohydrate starches if okay for example low-carb tortilla.  Patient does not drink any sugar sweetened beverages on a routine basis (exception to vacations)  breakfast: Coffee with sugar-free creamer  Lunch: Salad with rotisserie chicken or turkey  Dinner: Protein, vegetable  Snacks: String cheese, vegetables  Beverages: Coffee drinks 16oz/day and Water 64oz/day    Misses meals?  Breakfast  Eats out: 1 meals/per month     Previous diet education:  No     Food allergies/intolerances: No known food allergies    Diet is high in: calories  Diet is low in: fiber    EXERCISE: no regular exercise program    SOCIO/ECONOMIC:   Lives with: spouse    MEDICATIONS:  Current Outpatient Medications   Medication     buPROPion (WELLBUTRIN XL) 150 MG " "24 hr tablet     No current facility-administered medications for this visit.       LABS:  No results found for: \"NA\"   No results found for: \"POTASSIUM\"  No results found for: \"CHLORIDE\"  No results found for: \"MICHELLE\"  No results found for: \"CO2\"  No results found for: \"BUN\"  No results found for: \"CR\"  Lab Results   Component Value Date    GLC 94 06/06/2011     Lab Results   Component Value Date     02/19/2024     Direct Measure HDL   Date Value Ref Range Status   02/19/2024 63 >=50 mg/dL Final   ]  No results found for: \"GFRESTIMATED\"  No results found for: \"CR\"  No results found for: \"MICROALBUMIN\"    ANTHROPOMETRICS:  Vitals: Ht 1.588 m (5' 2.5\")   Wt 96.3 kg (212 lb 6.4 oz)   LMP 02/16/2024   BMI 38.23 kg/m    Body mass index is 38.23 kg/m .      Wt Readings from Last 5 Encounters:   03/04/24 96.3 kg (212 lb 6.4 oz)   02/19/24 95.7 kg (211 lb)   11/20/20 91.2 kg (201 lb)   12/24/19 94 kg (207 lb 3.2 oz)   09/16/19 93.9 kg (207 lb)       ESTIMATED KCAL REQUIREMENTS:  1100 kcal per day  NUTRITION DIAGNOSIS: Overweight/Obesity related to increased calorie intake as evidenced by BMI Body mass index is 38.23 kg/m .  Therapeutic dietary recommendations for hypertriglyceridemia, prediabetes    Nutrition intervention:  Today the patient is instructed on the basic physiology of prediabetes - we discussed insulin resistance and the role it has in preventing the potential of developing type 2 diabetes mellitus in the future.  Goals to reduce insulin resistance include increasing physical activity and decreasing weight.  Pt was given instructions on the effects of a healthy lifestyle including sleep, stress, food, physical activity and how they relate overall to being healthy and having good control of blood glucose levels, lipid values, and blood pressure.     Pts weight loss goal of 10% of current body weight 22 pounds as a reasonable goal to help increase insulin sensitivity.    Healthy Eating - Education on " what foods are primary sources of carbohydrates and how intake affects BG readings, edu on carbohydrate counting, reading food labels, appropriate portion sizes, well balanced meals, diabetic plate method as a general rule.  Patient is provided with ideas to incorporate dietary recommendations into meal planning, weight loss tips and mindful eating.  Instructed on Therapeutic Lifestyle Changes (TLC) nutrition plan for heart healthy eating.  Focusing on fiber and more vegetarian based meals   High fiber diet (25 - 35gm); Soluble fiber 10+ gm/ day dietary supplement discussed as an option to aid with intake    Eat more omega 3 fats  Calcium plus vitamin D supplementation recommended as intake is low  Being Active - Education on how exercise helps with :    - lowering BG by increasing the muscles ability to take up and use glucose   - weight loss   - healthier heart (improve lipid profile)   - improve sleep, mood, energy   - decrease stress      Monitoring - A1C yearly or prn   Taking Medication -will not start on medication at this time to evaluate in the future    PATIENT'S BEHAVIOR CHANGE GOALS:   See Patient Instructions for patient stated behavior change goals. AVS was printed and given to patient at today's appointment.    MONITOR / EVALUATE:  RD will monitor/evaluate:  Blood Glucose / A1c  Pertinent Labs  Weight change    FOLLOW-UP:  Follow up with RD as needed.  Time spent in minutes: 30  Encounter: Individual

## 2024-03-04 NOTE — PATIENT INSTRUCTIONS
1100 calories     Soluble Fiber 10+grams/ day     Calcium 500 or 600mg with Vit D 1000mg or more is good twice a day     16:8 intermittent fasting     Two good yogurt, chobani triple zero     Education given to support: general nutrition guidelines and heart healthy diet  Education Materials Provided:   Cholesterol-lowering nutrition therapy   - Choose lean protein and low-fat dairy foods to reduce saturated fat intake   - Avoid trans fat   - Choose foods with heart healthy fats   - Limit the amount of cholesterol to less than 200 mg/day   - Limit refined carbohydrates   - Participate in physical activity 60 minutes daily   - Tips to lower LDL cholesterol   -Eat more plant-based or vegetarian meals using beans and soy foods for protein  Foods recommended and foods not recommended listing provided divided between food groups  Sample heart healthy menu provided        Goals:  Practice healthy stress management (mindful eating, think are you physically hungry or are you board, stressed, emotional etc.) make of list of things to do besides eat.    Try to get good quality sleep with a goal of 7-8 hours per night.  Stay physically active on a daily basis and throughout the year.  Recommend a fitness tracker; gradually increase the average to a minimum of 6000 steps with the ultimate goal of 10,000 steps per day.      Eat in a healthy way; follow the plate method.  Keep a food record ([employer]; [employer]).  Nutrition reference:  Eat 3 meals a day; snacks are optional.    A meal is 3 or more food groups; make it colorful for better nutrition.

## 2024-03-10 ENCOUNTER — HEALTH MAINTENANCE LETTER (OUTPATIENT)
Age: 39
End: 2024-03-10

## 2025-01-20 ENCOUNTER — PATIENT OUTREACH (OUTPATIENT)
Dept: CARE COORDINATION | Facility: CLINIC | Age: 40
End: 2025-01-20
Payer: COMMERCIAL

## 2025-01-27 ENCOUNTER — HOSPITAL ENCOUNTER (OUTPATIENT)
Dept: MAMMOGRAPHY | Facility: CLINIC | Age: 40
Discharge: HOME | End: 2025-01-27
Attending: FAMILY MEDICINE | Admitting: FAMILY MEDICINE
Payer: COMMERCIAL

## 2025-01-27 DIAGNOSIS — Z12.31 VISIT FOR SCREENING MAMMOGRAM: ICD-10-CM

## 2025-01-27 PROCEDURE — 77063 BREAST TOMOSYNTHESIS BI: CPT

## 2025-02-03 ENCOUNTER — PATIENT OUTREACH (OUTPATIENT)
Dept: CARE COORDINATION | Facility: CLINIC | Age: 40
End: 2025-02-03
Payer: COMMERCIAL

## 2025-04-06 ENCOUNTER — HEALTH MAINTENANCE LETTER (OUTPATIENT)
Age: 40
End: 2025-04-06

## 2025-04-16 ASSESSMENT — ANXIETY QUESTIONNAIRES
GAD7 TOTAL SCORE: 13
GAD7 TOTAL SCORE: 13
6. BECOMING EASILY ANNOYED OR IRRITABLE: NEARLY EVERY DAY
5. BEING SO RESTLESS THAT IT IS HARD TO SIT STILL: NOT AT ALL
8. IF YOU CHECKED OFF ANY PROBLEMS, HOW DIFFICULT HAVE THESE MADE IT FOR YOU TO DO YOUR WORK, TAKE CARE OF THINGS AT HOME, OR GET ALONG WITH OTHER PEOPLE?: VERY DIFFICULT
3. WORRYING TOO MUCH ABOUT DIFFERENT THINGS: MORE THAN HALF THE DAYS
7. FEELING AFRAID AS IF SOMETHING AWFUL MIGHT HAPPEN: NEARLY EVERY DAY
1. FEELING NERVOUS, ANXIOUS, OR ON EDGE: SEVERAL DAYS
IF YOU CHECKED OFF ANY PROBLEMS ON THIS QUESTIONNAIRE, HOW DIFFICULT HAVE THESE PROBLEMS MADE IT FOR YOU TO DO YOUR WORK, TAKE CARE OF THINGS AT HOME, OR GET ALONG WITH OTHER PEOPLE: VERY DIFFICULT
2. NOT BEING ABLE TO STOP OR CONTROL WORRYING: MORE THAN HALF THE DAYS
7. FEELING AFRAID AS IF SOMETHING AWFUL MIGHT HAPPEN: NEARLY EVERY DAY
4. TROUBLE RELAXING: MORE THAN HALF THE DAYS

## 2025-04-21 ENCOUNTER — OFFICE VISIT (OUTPATIENT)
Dept: FAMILY MEDICINE | Facility: CLINIC | Age: 40
End: 2025-04-21
Payer: COMMERCIAL

## 2025-04-21 VITALS
HEIGHT: 63 IN | BODY MASS INDEX: 36.73 KG/M2 | OXYGEN SATURATION: 97 % | HEART RATE: 76 BPM | SYSTOLIC BLOOD PRESSURE: 116 MMHG | WEIGHT: 207.3 LBS | RESPIRATION RATE: 12 BRPM | TEMPERATURE: 97.5 F | DIASTOLIC BLOOD PRESSURE: 78 MMHG

## 2025-04-21 DIAGNOSIS — F33.1 MODERATE RECURRENT MAJOR DEPRESSION (H): ICD-10-CM

## 2025-04-21 DIAGNOSIS — Z00.00 ROUTINE GENERAL MEDICAL EXAMINATION AT A HEALTH CARE FACILITY: Primary | ICD-10-CM

## 2025-04-21 DIAGNOSIS — Z82.49 FAMILY HISTORY OF ATRIAL FIBRILLATION: ICD-10-CM

## 2025-04-21 PROCEDURE — 99396 PREV VISIT EST AGE 40-64: CPT | Performed by: FAMILY MEDICINE

## 2025-04-21 PROCEDURE — 3074F SYST BP LT 130 MM HG: CPT | Performed by: FAMILY MEDICINE

## 2025-04-21 PROCEDURE — 3078F DIAST BP <80 MM HG: CPT | Performed by: FAMILY MEDICINE

## 2025-04-21 RX ORDER — BUPROPION HYDROCHLORIDE 300 MG/1
300 TABLET ORAL EVERY MORNING
Qty: 90 TABLET | Refills: 4 | Status: SHIPPED | OUTPATIENT
Start: 2025-04-21

## 2025-04-21 RX ORDER — BUPROPION HYDROCHLORIDE 150 MG/1
150 TABLET ORAL EVERY MORNING
Qty: 90 TABLET | Refills: 4 | Status: SHIPPED | OUTPATIENT
Start: 2025-04-21 | End: 2025-04-21

## 2025-04-21 ASSESSMENT — PATIENT HEALTH QUESTIONNAIRE - PHQ9
SUM OF ALL RESPONSES TO PHQ QUESTIONS 1-9: 8
SUM OF ALL RESPONSES TO PHQ QUESTIONS 1-9: 8
10. IF YOU CHECKED OFF ANY PROBLEMS, HOW DIFFICULT HAVE THESE PROBLEMS MADE IT FOR YOU TO DO YOUR WORK, TAKE CARE OF THINGS AT HOME, OR GET ALONG WITH OTHER PEOPLE: VERY DIFFICULT

## 2025-04-21 ASSESSMENT — ANXIETY QUESTIONNAIRES: GAD7 TOTAL SCORE: 13

## 2025-04-21 NOTE — PROGRESS NOTES
"Preventive Care Visit  [facility]  Landon Mcdonald MD, Family Medicine  Apr 21, 2025      Assessment & Plan     Routine general medical examination at a health care facility  Health maintenance updated, preventive services reviewed, vaccines discussed, questions are answered, patient encouraged to continue annual wellness visits to review preventive services    Moderate recurrent major depression (H)  Patient noticing increased symptoms.  Will try increasing the dose of bupropion to 300 mg.  I have also recommended that she consider counseling as I believe that could be beneficial for her.  Order placed for mental health referral.  - buPROPion (WELLBUTRIN XL) 300 MG 24 hr tablet; Take 1 tablet (300 mg) by mouth every morning.  - Adult Mental Health  Referral; Future    Family history of atrial fibrillation  Discussed with patient, no current symptoms for the patient at this time and exam is normal.  Reassurance that while atrial fibrillation is common it is not always genetic.  If patient develops any symptoms of palpitations she will let me know and we can proceed with a Zio patch referral    BMI 37.0-37.9, adult  Patient with concerns about weight, discussed options, did have some decreased appetite with bupropion.  Overall following a healthy diet and exercising when she can.  Is under significant stress.  If she would like to consider additional medication could add naltrexone or consider phentermine.          BMI  Estimated body mass index is 37.31 kg/m  as calculated from the following:    Height as of this encounter: 1.588 m (5' 2.5\").    Weight as of this encounter: 94 kg (207 lb 4.8 oz).             Elfego Vuong is a 40 year old, presenting for the following:  Physical (Patient is not fasting - ), Weight Problem, and Irregular Heart Beat (\"Flutter\" - when laying down, trying to sleep - less now since she quit smoking. )        4/21/2025    11:13 AM   Additional " Questions   Roomed by Nallely BUTLER CMA   Accompanied by None        Patient is here for annual preventive exam  Notes that she is more irritable.  Maternal grandmother has atrial fibrillation with new onset and diagnosed with a stroke. Family hx updated. Has previously had rare episodes of palpitations but those seem to be better since quitting smoking.  Will continue to monitor and follow-up if any new concerns  Patient notes that depression has seemed a little bit worse.  Wondering about adjusting the dose of her medication  Also concerns about weight.  BMI currently 37.  Working at dietary changes and following a strict diet but still not losing significant weight.  Has a hard time fitting in exercise due to working 2 jobs.        Advance Care Planning    Discussed advance care planning with patient; however, patient declined at this time.        2/19/2024   General Health   How would you rate your overall physical health? (!) FAIR   Feel stress (tense, anxious, or unable to sleep) Rather much         2/19/2024   Nutrition   Three or more servings of calcium each day? Yes   Diet: Regular (no restrictions)   How many servings of fruit and vegetables per day? (!) 0-1   How many sweetened beverages each day? 0-1         2/19/2024   Exercise   Days per week of moderate/strenous exercise 0 days         2/19/2024   Social Factors   Frequency of gathering with friends or relatives Once a week   Worry food won't last until get money to buy more No   Food not last or not have enough money for food? No   Do you have housing? (Housing is defined as stable permanent housing and does not include staying ouside in a car, in a tent, in an abandoned building, in an overnight shelter, or couch-surfing.) Yes   Are you worried about losing your housing? No   Lack of transportation? No   Unable to get utilities (heat,electricity)? No         2/19/2024   Dental   Dentist two times every year? (!) NO       Today's PHQ-9 Score:        2025    10:58 AM   PHQ-9 SCORE   PHQ-9 Total Score MyChart 8 (Mild depression)   PHQ-9 Total Score 8        Patient-reported         2024   Substance Use   Alcohol more than 3/day or more than 7/wk No   Do you use any other substances recreationally? No     Social History     Tobacco Use    Smoking status: Former     Types: Cigarettes     Start date: 10/2024     Quit date: 10/2004     Years since quittin.5     Passive exposure: Past    Smokeless tobacco: Never   Vaping Use    Vaping status: Never Used   Substance Use Topics    Alcohol use: Yes    Drug use: Never           2025   LAST FHS-7 RESULTS   1st degree relative breast or ovarian cancer No   Any relative bilateral breast cancer No   Any male have breast cancer No   Any ONE woman have BOTH breast AND ovarian cancer No   Any woman with breast cancer before 50yrs No   2 or more relatives with breast AND/OR ovarian cancer No   2 or more relatives with breast AND/OR bowel cancer No              History of abnormal Pap smear: YES - reflected in Problem List and Health Maintenance accordingly        Latest Ref Rng & Units 2024     1:08 PM   PAP / HPV   PAP  Negative for Intraepithelial Lesion or Malignancy (NILM)    HPV 16 DNA Negative Negative    HPV 18 DNA Negative Negative    Other HR HPV Negative Negative      ASCVD Risk   The 10-year ASCVD risk score (Quan TUBBS, et al., 2019) is: 0.5%    Values used to calculate the score:      Age: 40 years      Sex: Female      Is Non- : No      Diabetic: No      Tobacco smoker: No      Systolic Blood Pressure: 116 mmHg      Is BP treated: No      HDL Cholesterol: 63 mg/dL      Total Cholesterol: 234 mg/dL        2024   Contraception/Family Planning   Questions about contraception or family planning No        Reviewed and updated as needed this visit by Provider   Tobacco  Allergies  Meds  Problems  Med Hx  Surg Hx  Fam Hx                     Objective   "  Exam  /78   Pulse 76   Temp 97.5  F (36.4  C)   Resp 12   Ht 1.588 m (5' 2.5\")   Wt 94 kg (207 lb 4.8 oz)   LMP 03/23/2025 (Exact Date)   SpO2 97%   BMI 37.31 kg/m     Estimated body mass index is 37.31 kg/m  as calculated from the following:    Height as of this encounter: 1.588 m (5' 2.5\").    Weight as of this encounter: 94 kg (207 lb 4.8 oz).    Physical Exam  GENERAL: alert and no distress  EYES: Eyes grossly normal to inspection, PERRL and conjunctivae and sclerae normal  HENT: ear canals and TM's normal, nose and mouth without ulcers or lesions  NECK: no adenopathy, no asymmetry, masses, or scars  RESP: lungs clear to auscultation - no rales, rhonchi or wheezes  CV: regular rate and rhythm, normal S1 S2, no S3 or S4, no murmur, click or rub, no peripheral edema  ABDOMEN: soft, nontender, no hepatosplenomegaly, no masses and bowel sounds normal  MS: no gross musculoskeletal defects noted, no edema  SKIN: no suspicious lesions or rashes  NEURO: Normal strength and tone, mentation intact and speech normal  PSYCH: mentation appears normal, affect normal/bright        Signed Electronically by: Landon Mcdonald MD    "

## 2025-04-21 NOTE — PATIENT INSTRUCTIONS
Patient Education   Preventive Care Advice   This is general advice given by our system to help you stay healthy. However, your care team may have specific advice just for you. Please talk to your care team about your preventive care needs.  Nutrition  Eat 5 or more servings of fruits and vegetables each day.  Try wheat bread, brown rice and whole grain pasta (instead of white bread, rice, and pasta).  Get enough calcium and vitamin D. Check the label on foods and aim for 100% of the RDA (recommended daily allowance).  Lifestyle  Exercise at least 150 minutes each week  (30 minutes a day, 5 days a week).  Do muscle strengthening activities 2 days a week. These help control your weight and prevent disease.  No smoking.  Wear sunscreen to prevent skin cancer.  Have a dental exam and cleaning every 6 months.  Yearly exams  See your health care team every year to talk about:  Any changes in your health.  Any medicines your care team has prescribed.  Preventive care, family planning, and ways to prevent chronic diseases.  Shots (vaccines)   HPV shots (up to age 26), if you've never had them before.  Hepatitis B shots (up to age 59), if you've never had them before.  COVID-19 shot: Get this shot when it's due.  Flu shot: Get a flu shot every year.  Tetanus shot: Get a tetanus shot every 10 years.  Pneumococcal, hepatitis A, and RSV shots: Ask your care team if you need these based on your risk.  Shingles shot (for age 50 and up)  General health tests  Diabetes screening:  Starting at age 35, Get screened for diabetes at least every 3 years.  If you are younger than age 35, ask your care team if you should be screened for diabetes.  Cholesterol test: At age 39, start having a cholesterol test every 5 years, or more often if advised.  Bone density scan (DEXA): At age 50, ask your care team if you should have this scan for osteoporosis (brittle bones).  Hepatitis C: Get tested at least once in your life.  STIs (sexually  transmitted infections)  Before age 24: Ask your care team if you should be screened for STIs.  After age 24: Get screened for STIs if you're at risk. You are at risk for STIs (including HIV) if:  You are sexually active with more than one person.  You don't use condoms every time.  You or a partner was diagnosed with a sexually transmitted infection.  If you are at risk for HIV, ask about PrEP medicine to prevent HIV.  Get tested for HIV at least once in your life, whether you are at risk for HIV or not.  Cancer screening tests  Cervical cancer screening: If you have a cervix, begin getting regular cervical cancer screening tests starting at age 21.  Breast cancer scan (mammogram): If you've ever had breasts, begin having regular mammograms starting at age 40. This is a scan to check for breast cancer.  Colon cancer screening: It is important to start screening for colon cancer at age 45.  Have a colonoscopy test every 10 years (or more often if you're at risk) Or, ask your provider about stool tests like a FIT test every year or Cologuard test every 3 years.  To learn more about your testing options, visit:   .  For help making a decision, visit:   https://bit.ly/gh77992.  Prostate cancer screening test: If you have a prostate, ask your care team if a prostate cancer screening test (PSA) at age 55 is right for you.  Lung cancer screening: If you are a current or former smoker ages 50 to 80, ask your care team if ongoing lung cancer screenings are right for you.  For informational purposes only. Not to replace the advice of your health care provider. Copyright   2023 [facility]. All rights reserved. Clinically reviewed by the [facility]. ResQU [id number] - REV 01/24.  Learning About Depression Screening  What is depression screening?  Depression screening is a way to see if you have depression symptoms. It may be done by a doctor or counselor. It's often part of a routine  "checkup. That's because your mental health is just as important as your physical health.  Depression is a mental health condition that affects how you feel, think, and act. You may:  Have less energy.  Lose interest in your daily activities.  Feel sad and grouchy for a long time.  Depression is very common. It affects people of all ages.  Many things can lead to depression. Some people become depressed after they have a stroke or find out they have a major illness like cancer or heart disease. The death of a loved one or a breakup may lead to depression. It can run in families. Most experts believe that a combination of inherited genes and stressful life events can cause it.  What happens during screening?  You may be asked to fill out a form about your depression symptoms. You and the doctor will discuss your answers. The doctor may ask you more questions to learn more about how you think, act, and feel.  What happens after screening?  If you have symptoms of depression, your doctor will talk to you about your options.  Doctors usually treat depression with medicines or counseling. Often, combining the two works best. Many people don't get help because they think that they'll get over the depression on their own. But people with depression may not get better unless they get treatment.  The cause of depression is not well understood. There may be many factors involved. But if you have depression, it's not your fault.  A serious symptom of depression is thinking about death or suicide. If you or someone you care about talks about this or about feeling hopeless, get help right away.  It's important to know that depression can be treated. Medicine, counseling, and self-care may help.  Where can you learn more?  Go to https://www.healthwise.net/patiented  Enter T185 in the search box to learn more about \"Learning About Depression Screening.\"  Current as of: July 31, 2024  Content Version: 14.4    [id number] Gutierrez " [employer].   Care instructions adapted under license by your healthcare professional. If you have questions about a medical condition or this instruction, always ask your healthcare professional. [facility] [employer] disclaims any warranty or liability for your use of this information.

## 2025-04-29 ENCOUNTER — PATIENT OUTREACH (OUTPATIENT)
Dept: CARE COORDINATION | Facility: CLINIC | Age: 40
End: 2025-04-29
Payer: COMMERCIAL

## 2025-08-24 ASSESSMENT — PATIENT HEALTH QUESTIONNAIRE - PHQ9
10. IF YOU CHECKED OFF ANY PROBLEMS, HOW DIFFICULT HAVE THESE PROBLEMS MADE IT FOR YOU TO DO YOUR WORK, TAKE CARE OF THINGS AT HOME, OR GET ALONG WITH OTHER PEOPLE: SOMEWHAT DIFFICULT
SUM OF ALL RESPONSES TO PHQ QUESTIONS 1-9: 14
SUM OF ALL RESPONSES TO PHQ QUESTIONS 1-9: 14

## 2025-08-25 ENCOUNTER — OFFICE VISIT (OUTPATIENT)
Dept: FAMILY MEDICINE | Facility: CLINIC | Age: 40
End: 2025-08-25
Payer: COMMERCIAL

## 2025-08-25 VITALS
RESPIRATION RATE: 14 BRPM | BODY MASS INDEX: 36.57 KG/M2 | OXYGEN SATURATION: 98 % | TEMPERATURE: 98.2 F | WEIGHT: 206.4 LBS | SYSTOLIC BLOOD PRESSURE: 132 MMHG | DIASTOLIC BLOOD PRESSURE: 80 MMHG | HEART RATE: 74 BPM | HEIGHT: 63 IN

## 2025-08-25 DIAGNOSIS — Z13.1 SCREENING FOR DIABETES MELLITUS: ICD-10-CM

## 2025-08-25 DIAGNOSIS — R63.5 WEIGHT GAIN: Primary | ICD-10-CM

## 2025-08-25 DIAGNOSIS — R13.10 DYSPHAGIA, UNSPECIFIED TYPE: ICD-10-CM

## 2025-08-25 LAB
EST. AVERAGE GLUCOSE BLD GHB EST-MCNC: 120 MG/DL
HBA1C MFR BLD: 5.8 % (ref 0–5.6)

## 2025-08-25 PROCEDURE — 83036 HEMOGLOBIN GLYCOSYLATED A1C: CPT | Performed by: NURSE PRACTITIONER

## 2025-08-25 PROCEDURE — 84443 ASSAY THYROID STIM HORMONE: CPT | Performed by: NURSE PRACTITIONER

## 2025-08-25 PROCEDURE — 3075F SYST BP GE 130 - 139MM HG: CPT | Performed by: NURSE PRACTITIONER

## 2025-08-25 PROCEDURE — 99213 OFFICE O/P EST LOW 20 MIN: CPT | Performed by: NURSE PRACTITIONER

## 2025-08-25 PROCEDURE — 84481 FREE ASSAY (FT-3): CPT | Performed by: NURSE PRACTITIONER

## 2025-08-25 PROCEDURE — 3079F DIAST BP 80-89 MM HG: CPT | Performed by: NURSE PRACTITIONER

## 2025-08-25 PROCEDURE — G2211 COMPLEX E/M VISIT ADD ON: HCPCS | Performed by: NURSE PRACTITIONER

## 2025-08-25 PROCEDURE — 84439 ASSAY OF FREE THYROXINE: CPT | Performed by: NURSE PRACTITIONER

## 2025-08-25 PROCEDURE — 36415 COLL VENOUS BLD VENIPUNCTURE: CPT | Performed by: NURSE PRACTITIONER

## 2025-08-25 RX ORDER — OMEPRAZOLE 20 MG/1
20 CAPSULE, DELAYED RELEASE ORAL DAILY
Qty: 30 CAPSULE | Refills: 0 | Status: SHIPPED | OUTPATIENT
Start: 2025-08-25

## 2025-08-26 ENCOUNTER — RESULTS FOLLOW-UP (OUTPATIENT)
Dept: FAMILY MEDICINE | Facility: CLINIC | Age: 40
End: 2025-08-26
Payer: COMMERCIAL

## 2025-08-26 DIAGNOSIS — R73.03 PREDIABETES: Primary | ICD-10-CM

## 2025-08-26 LAB
T3FREE SERPL-MCNC: 3.2 PG/ML (ref 2–4.4)
T4 FREE SERPL-MCNC: 0.98 NG/DL (ref 0.9–1.7)
TSH SERPL DL<=0.005 MIU/L-ACNC: 1.94 UIU/ML (ref 0.3–4.2)

## 2025-08-26 RX ORDER — METFORMIN HYDROCHLORIDE 500 MG/1
TABLET, EXTENDED RELEASE ORAL
Qty: 70 TABLET | Refills: 0 | Status: SHIPPED | OUTPATIENT
Start: 2025-08-26